# Patient Record
Sex: MALE | Race: BLACK OR AFRICAN AMERICAN | Employment: FULL TIME | URBAN - METROPOLITAN AREA
[De-identification: names, ages, dates, MRNs, and addresses within clinical notes are randomized per-mention and may not be internally consistent; named-entity substitution may affect disease eponyms.]

---

## 2017-01-25 ENCOUNTER — HOSPITAL ENCOUNTER (EMERGENCY)
Age: 37
Discharge: HOME | End: 2017-01-25
Attending: EMERGENCY MEDICINE
Payer: COMMERCIAL

## 2017-01-25 VITALS
SYSTOLIC BLOOD PRESSURE: 131 MMHG | DIASTOLIC BLOOD PRESSURE: 88 MMHG | TEMPERATURE: 99 F | WEIGHT: 150 LBS | OXYGEN SATURATION: 98 % | HEART RATE: 96 BPM | RESPIRATION RATE: 14 BRPM | HEIGHT: 69 IN | BODY MASS INDEX: 22.22 KG/M2

## 2017-01-25 DIAGNOSIS — J06.9 ACUTE UPPER RESPIRATORY INFECTION: Primary | ICD-10-CM

## 2017-01-25 PROCEDURE — 99282 EMERGENCY DEPT VISIT SF MDM: CPT

## 2017-01-25 RX ORDER — GUAIFENESIN, PSEUDOEPHEDRINE HYDROCHLORIDE 600; 60 MG/1; MG/1
1 TABLET, EXTENDED RELEASE ORAL EVERY 12 HOURS
Qty: 15 TAB | Refills: 0 | Status: SHIPPED | OUTPATIENT
Start: 2017-01-25 | End: 2017-06-06 | Stop reason: CLARIF

## 2017-01-25 RX ORDER — IBUPROFEN 800 MG/1
800 TABLET ORAL
Qty: 20 TAB | Refills: 0 | Status: SHIPPED | OUTPATIENT
Start: 2017-01-25 | End: 2017-02-01

## 2017-01-25 RX ORDER — LIDOCAINE HYDROCHLORIDE 20 MG/ML
5 SOLUTION OROPHARYNGEAL
Qty: 1 BOTTLE | Refills: 0 | Status: SHIPPED | OUTPATIENT
Start: 2017-01-25 | End: 2017-06-06 | Stop reason: CLARIF

## 2017-01-25 NOTE — ED NOTES
Seen for complaints of cold symptoms x 2 days. Sinus congestion, sore throat, dry cough, decreased appetite, nausea without vomiting. Denies other complaints. States that child is also sick. Emergency Department Nursing Plan of Care       The Nursing Plan of Care is developed from the Nursing assessment and Emergency Department Attending provider initial evaluation. The plan of care may be reviewed in the ED Provider note.     The Plan of Care was developed with the following considerations:   Patient / Family readiness to learn indicated by:verbalized understanding  Persons(s) to be included in education: patient  Barriers to Learning/Limitations:No    Signed     Kaiser Matos    1/25/2017   1:30 PM

## 2017-01-25 NOTE — ED NOTES
Patient (s)  given copy of dc instructions and 3 paper script(s) and 0 electronic scripts. Patient (s)  verbalized understanding of instructions and script (s). Patient given a current medication reconciliation form and verbalized understanding of their medications. Patient (s) verbalized understanding of the importance of discussing medications with  his or her physician or clinic they will be following up with. Patient alert and oriented and in no acute distress. Patient discharged home ambulatory.

## 2017-01-25 NOTE — DISCHARGE INSTRUCTIONS

## 2017-01-25 NOTE — ED PROVIDER NOTES
HPI Comments: 36y. o. male presents to ED complaining of cold Sx onset 2 days ago. Patient has had mild sore throat, cough, nasal congestion, sneezing and mild HA. He has taken no medications for his Sx and wants to \"nip it\" before it gets too bad. Patient denies CP, SOB, N/V/D, abdominal pain or any other symptoms or complaints. Patient is a 31 y.o. male presenting with cold symptoms. The history is provided by the patient. Cold Symptoms    Associated symptoms include congestion, sneezing, sore throat and cough. Past Medical History:   Diagnosis Date    Asthma     Sickle cell anemia ([facility] [address] 7/5/16     Diagnosed as child       History reviewed. No pertinent past surgical history. History reviewed. No pertinent family history. Social History     Social History    Marital status: SINGLE     Spouse name: N/A    Number of children: N/A    Years of education: N/A     Occupational History    Not on file. Social History Main Topics    Smoking status: Current Every Day Smoker     Packs/day: 0.50     Years: 17.00    Smokeless tobacco: Not on file    Alcohol use 8.4 oz/week     14 Cans of beer per week    Drug use: Yes     Special: Marijuana      Comment: July 2016, reports history of marijuana use    Sexual activity: Not on file     Other Topics Concern    Not on file     Social History Narrative         ALLERGIES: Review of patient's allergies indicates no known allergies. Review of Systems   HENT: Positive for congestion, sneezing and sore throat. Respiratory: Positive for cough. All other systems reviewed and are negative. Vitals:    01/25/17 1319   BP: 131/88   Pulse: 96   Resp: 14   Temp: 99 °F (37.2 °C)   SpO2: 98%   Weight: 68 kg (150 lb)   Height: 5' 9\" (1.753 m)            Physical Exam   Constitutional: He appears well-developed and well-nourished. No distress. HENT:   Head: Normocephalic and atraumatic.    Right Ear: Tympanic membrane, external ear and ear canal normal.   Left Ear: Tympanic membrane, external ear and ear canal normal.   Nose: Nose normal.   Mouth/Throat: Uvula is midline, oropharynx is clear and moist and mucous membranes are normal.   Eyes: Conjunctivae and EOM are normal. Pupils are equal, round, and reactive to light. Neck: Trachea normal, normal range of motion and full passive range of motion without pain. Neck supple. Cardiovascular: Normal rate, regular rhythm, normal heart sounds and normal pulses. Pulmonary/Chest: Effort normal and breath sounds normal.   Abdominal: Soft. Normal appearance and bowel sounds are normal. There is no tenderness. There is no rebound and no guarding. Musculoskeletal: Normal range of motion. Neurological: He is alert. He has normal strength and normal reflexes. Skin: Skin is warm and dry. He is not diaphoretic. Psychiatric: He has a normal mood and affect. His speech is normal and behavior is normal. Judgment normal.        [facility]  ED Course       Procedures    PROGRESS NOTE:  1:22 PM   Initial assessment performed. DISCHARGE NOTE:   Adriana Kimball results have been reviewed with patient and/or family. Patient and/or family has been counseled regarding diagnosis, treatment, and plan. Patient and/or family verbally conveys understanding and agreement of the signs, symptoms, diagnosis, treatment and prognosis and additionally agrees to follow up as discussed. Patient and/or family also agrees with the care-plan and conveys that all of his/her questions have been answered. I have also provided discharge instructions for the patient and/or family that include: educational information regarding their diagnosis and treatment, and list of reasons why they would want to return to the ED prior to their follow-up appointment, should patient's condition change. CLINICAL IMPRESSION    1.  Acute upper respiratory infection         AFTER VISIT PLAN    Follow-up Information     Follow up With Details Comments [address] [facility] Schedule an appointment as soon as possible for a visit in 3 days  [address] [address] [phone]    [facility] EMERGENCY DEPT  If symptoms worsen [address]  [phone]          Current Discharge Medication List      START taking these medications    Details   PSEUDOEPHEDRINE-guaiFENesin (MUCINEX D)  mg per tablet Take 1 Tab by mouth every twelve (12) hours. Qty: 15 Tab, Refills: 0      lidocaine (LIDOCAINE VISCOUS) 2 % solution Take 5 mL by mouth four (4) times daily as needed for Pain. Mix with equal parts water. Swish and swallow. Qty: 1 Bottle, Refills: 0      ibuprofen (MOTRIN) 800 mg tablet Take 1 Tab by mouth every six (6) hours as needed for Pain for up to 7 days.   Qty: 20 Tab, Refills: 0

## 2017-06-06 ENCOUNTER — HOSPITAL ENCOUNTER (EMERGENCY)
Age: 37
Discharge: HOME | End: 2017-06-06
Attending: EMERGENCY MEDICINE
Payer: SELF-PAY

## 2017-06-06 VITALS
TEMPERATURE: 98.6 F | HEART RATE: 68 BPM | OXYGEN SATURATION: 99 % | BODY MASS INDEX: 22.51 KG/M2 | WEIGHT: 152 LBS | RESPIRATION RATE: 19 BRPM | DIASTOLIC BLOOD PRESSURE: 86 MMHG | SYSTOLIC BLOOD PRESSURE: 118 MMHG | HEIGHT: 69 IN
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
## 2017-06-06 DIAGNOSIS — L03.116 CELLULITIS OF LEFT LOWER EXTREMITY: ICD-10-CM

## 2017-06-06 PROCEDURE — 99282 EMERGENCY DEPT VISIT SF MDM: CPT

## 2017-06-06 RX ORDER — NAPROXEN 500 MG/1
500 TABLET ORAL 2 TIMES DAILY WITH MEALS
Qty: 20 TAB | Refills: 0 | Status: SHIPPED | OUTPATIENT
Start: 2017-06-06 | End: 2017-06-16

## 2017-06-06 RX ORDER — DOXYCYCLINE 100 MG/1
100 CAPSULE ORAL 2 TIMES DAILY
Qty: 56 CAP | Refills: 0 | Status: SHIPPED | OUTPATIENT
Start: 2017-06-06 | End: 2017-07-04

## 2017-06-06 RX ORDER — TRAMADOL HYDROCHLORIDE 50 MG/1
50 TABLET ORAL
Qty: 18 TAB | Refills: 0 | Status: SHIPPED | OUTPATIENT
Start: 2017-06-06 | End: 2017-11-30

## 2017-06-06 NOTE — ED NOTES
Reviewed discharge instructions, follow up information and prescriptions with patient. Ambulatory independently out of ED. Provided with work excuse. Discharged home.

## 2017-06-06 NOTE — ED PROVIDER NOTES
HPI   To ED with complaints of bilat knee and R shoulder aching for several days. He attributes this to \"my sickle cell trait\". Denies sickle cell dz, has never had sickle crisis but notes that his father used to give him percocet and that helped. Yesterday noted a small area of erythema with small dark center, GF told him she noted some \"drainge\", but he did not see any. Subjective fever yesterday. On questioning states he was in the woods one week ago. 2 days ago, he noted \"very small tick\" to left groin and \"removed the whole thing\". No rash at groin. No other lesions than as described. Notes that he periodically gets \"all over joint aches\" which he attributes to sickle trait \"and want to be started on some medication that will help\". Past Medical History:   Diagnosis Date    Asthma     Sickle cell anemia ([facility] [address] 7/5/16    Diagnosed as child       History reviewed. No pertinent surgical history. History reviewed. No pertinent family history. Social History     Social History    Marital status: SINGLE     Spouse name: N/A    Number of children: N/A    Years of education: N/A     Occupational History    Not on file. Social History Main Topics    Smoking status: Current Every Day Smoker     Packs/day: 0.50     Years: 17.00    Smokeless tobacco: Not on file    Alcohol use 8.4 oz/week     14 Cans of beer per week    Drug use: Yes     Special: Marijuana      Comment: July 2016, reports history of marijuana use    Sexual activity: Not on file     Other Topics Concern    Not on file     Social History Narrative         ALLERGIES: Review of patient's allergies indicates no known allergies. Review of Systems   Constitutional: Negative for chills and fever. HENT: Negative for congestion, rhinorrhea and sore throat. Eyes: Negative for pain and discharge. Respiratory: Negative for cough, chest tightness, shortness of breath and wheezing.     Cardiovascular: Negative for chest pain and palpitations. Gastrointestinal: Negative for abdominal pain, nausea and vomiting. Genitourinary: Negative for dysuria, frequency, hematuria, penile pain, scrotal swelling and urgency. Musculoskeletal: Negative for back pain and neck pain. Skin: Negative for rash and wound. Neurological: Negative for tremors, seizures, syncope, light-headedness and headaches. Psychiatric/Behavioral: Negative for confusion. The patient is not nervous/anxious. All other systems reviewed and are negative. Vitals:    06/06/17 0903   BP: 118/86   Pulse: 68   Resp: 19   Temp: 98.6 °F (37 °C)   SpO2: 99%   Weight: 68.9 kg (152 lb)   Height: 5' 9\" (1.753 m)            Physical Exam   Constitutional: He appears well-developed and well-nourished. Nursing note and vitals reviewed. GENERAL ASSESSMENT: active, alert, no acute distress, well hydrated, well nourished  SKIN: L groin with slight solid erythema at tiny open area (1mm). No fluctuance. No adenopathy. No halo. No drainage. Left lower leg, medial aspect with erythematous tender slightly swollen area. Small (2mm) dark center. No ulcer. No fluctuance, no drainage, no streaking. HEAD: Atraumatic, normocephalic. EARS: bilateral TM's and external ear canals normal.  NOSE: nasal mucosa, septum, turbinates normal bilaterally  MOUTH: mucous membranes moist.  pharynx without erythema or exudate  NECK: supple, full range of motion, no mass  RESP: respiratory effort normal. clear to auscultation with normal breath sounds bilaterally. CARD: Regular rate and rhythm, normal S1/S2, no murmurs, normal pulses and capillary fill  ABDOMEN: normal bowel sounds, soft and non-tender throughout, nondistended, no mass, no organomegaly. NEURO: AAOX3. Non-focal.   EXTREMITY: Normal muscle tone. No deformity or tenderness. Knees, elbows, shoulders, ankles, wrist, hands all examined. No swelling. No effusions. No erythema. FROM.        MDM  Number of Diagnoses or Management Options  Cellulitis of left lower extremity:   Tick bite, initial encounter:   Diagnosis management comments: DDX: viral syndrome, cellulitis, lyme dz  No evidence of any septic joint. Cardiac exam normal.   Prior h/h have been normal.   Will cover for Lyme which will also cover the left leg cellulitis. ED Course       Procedures        LABORATORY TESTS:  No results found for this or any previous visit (from the past 12 hour(s)). IMAGING RESULTS:  No orders to display       MEDICATIONS GIVEN:  Medications - No data to display    IMPRESSION:  1. Tick bite, initial encounter    2. Cellulitis of left lower extremity        PLAN:  1. Current Discharge Medication List      START taking these medications    Details   traMADol (ULTRAM) 50 mg tablet Take 1 Tab by mouth every six (6) hours as needed for Pain. Max Daily Amount: 200 mg. Qty: 18 Tab, Refills: 0      naproxen (NAPROSYN) 500 mg tablet Take 1 Tab by mouth two (2) times daily (with meals) for 10 days. Qty: 20 Tab, Refills: 0      doxycycline (MONODOX) 100 mg capsule Take 1 Cap by mouth two (2) times a day for 28 days. Qty: 56 Cap, Refills: 0           2. Follow-up Information     Follow up With Details Comments [address]   [address] [address] [place] [address] Rd   [address]  [phone]      Be sure to follow up with [address] or [place] to establish Primary Care Provider for continue management.           Return to ED if worse

## 2017-06-06 NOTE — DISCHARGE INSTRUCTIONS
Tick Bite: Care Instructions  Your Care Instructions    Ticks are small spiderlike animals. They bite to fasten themselves onto your skin and feed on your blood. Ticks can carry diseases. But most ticks do not carry diseases, and most tick bites do not cause serious health problems. Some people may have an allergic reaction to a tick bite. This reaction may be mild, with symptoms like itching and swelling. In rare cases, a severe allergic reaction may occur. Most of the time, all you need to do for a tick bite is relieve any symptoms you may have. Follow-up care is a key part of your treatment and safety. Be sure to make and go to all appointments, and call your doctor if you are having problems. It's also a good idea to know your test results and keep a list of the medicines you take. How can you care for yourself at home? · Put ice or a cold pack on the bite for 15 to 20 minutes once an hour. Put a thin cloth between the ice and your skin. · Try an over-the-counter medicine to relieve itching, redness, swelling, and pain. Be safe with medicines. Read and follow all instructions on the label. ¨ Take an antihistamine medicine, such as chlorpheniramine (Chlor-Trimeton) or diphenhydramine (Benadryl). These medicines may help relieve itching, redness, and swelling. ¨ Use a spray of local anesthetic that contains benzocaine, such as Solarcaine. It may help relieve pain. If your skin reacts to the spray, stop using it. ¨ Put calamine lotion on the skin. It may help relieve itching. To avoid tick bites  · Avoid ticks:  ¨ Learn where ticks are found in your community, and stay away from those areas if possible. ¨ Cover as much of your body as possible when you work or play in grassy or wooded areas. ¨ Use insect repellents, such as products containing DEET. You can spray them on your skin. ¨ Take steps to control ticks on your property if you live in an area where Lyme disease occurs.  Clear leaves, brush, tall grasses, woodpiles, and stone fences from around your house and the edges of your yard or garden. This may help get rid of ticks. · When you come in from outdoors, check your body for ticks, including your groin, head, and underarms. The ticks may be about the size of a sesame seed. If no one else can help you check for ticks on your scalp, comb your hair with a fine-tooth comb. · If you find a tick, remove it quickly. Use tweezers to grasp the tick as close to its mouth (the part in your skin) as possible. Slowly pull the tick straight out--do not twist or yank--until its mouth releases from your skin. · Ticks can come into your house on clothing, outdoor gear, and pets. These ticks can fall off and attach to you. ¨ Check your clothing and outdoor gear. Remove any ticks you find. Then put your clothing in a clothes dryer on high heat for 1 hour to kill any ticks that might remain. ¨ Check your pets for ticks after they have been outdoors. · When hiking in the woods, carry a small dry jar or ziplock bag. If you find a tick on your body, remove the tick and put it in the jar or bag. Store the container in the freezer so you can give it to your doctor if symptoms develop. The tick can be tested to learn whether it is carrying the bacteria that cause Lyme disease. When should you call for help? Call 911 anytime you think you may need emergency care. For example, call if:  · You have symptoms of a severe allergic reaction. These may include:  ¨ Sudden raised, red areas (hives) all over your body. ¨ Swelling of the throat, mouth, lips, or tongue. ¨ Trouble breathing. ¨ Passing out (losing consciousness). Or you may feel very lightheaded or suddenly feel weak, confused, or restless. Call your doctor now or seek immediate medical care if:  · You have signs of infection, such as:  ¨ Increased pain, swelling, warmth, or redness around the bite. ¨ Red streaks leading from the bite.   ¨ Pus draining from the bite. ¨ A fever. Watch closely for changes in your health, and be sure to contact your doctor if:  · You develop a new rash. · You have joint pain. · You are very tired. · You have flu-like symptoms. · You have symptoms for more than 1 week. Where can you learn more? Go to http://lito-gaviota.info/. Enter A067 in the search box to learn more about \"Tick Bite: Care Instructions. \"  Current as of: May 27, 2016  Content Version: 11.2  © 0423-4297 [employer]. Care instructions adapted under license by [employer] (which disclaims liability or warranty for this information). If you have questions about a medical condition or this instruction, always ask your healthcare professional. [address] any warranty or liability for your use of this information. Cellulitis: Care Instructions  Your Care Instructions    Cellulitis is a skin infection. It often occurs after a break in the skin from a scrape, cut, bite, or puncture, or after a rash. The doctor has checked you carefully, but problems can develop later. If you notice any problems or new symptoms, get medical treatment right away. Follow-up care is a key part of your treatment and safety. Be sure to make and go to all appointments, and call your doctor if you are having problems. It's also a good idea to know your test results and keep a list of the medicines you take. How can you care for yourself at home? · Take your antibiotics as directed. Do not stop taking them just because you feel better. You need to take the full course of antibiotics. · Prop up the infected area on pillows to reduce pain and swelling. Try to keep the area above the level of your heart as often as you can. · If your doctor told you how to care for your wound, follow your doctor's instructions.  If you did not get instructions, follow this general advice:  ¨ Wash the wound with clean water 2 times a day. Don't use hydrogen peroxide or alcohol, which can slow healing. ¨ You may cover the wound with a thin layer of petroleum jelly, such as Vaseline, and a nonstick bandage. ¨ Apply more petroleum jelly and replace the bandage as needed. · Be safe with medicines. Take pain medicines exactly as directed. ¨ If the doctor gave you a prescription medicine for pain, take it as prescribed. ¨ If you are not taking a prescription pain medicine, ask your doctor if you can take an over-the-counter medicine. To prevent cellulitis in the future  · Try to prevent cuts, scrapes, or other injuries to your skin. Cellulitis most often occurs where there is a break in the skin. · If you get a scrape, cut, mild burn, or bite, wash the wound with clean water as soon as you can to help avoid infection. Don't use hydrogen peroxide or alcohol, which can slow healing. · If you have swelling in your legs (edema), support stockings and good skin care may help prevent leg sores and cellulitis. · Take care of your feet, especially if you have diabetes or other conditions that increase the risk of infection. Wear shoes and socks. Do not go barefoot. If you have athlete's foot or other skin problems on your feet, talk to your doctor about how to treat them. When should you call for help? Call your doctor now or seek immediate medical care if:  · You have signs that your infection is getting worse, such as:  ¨ Increased pain, swelling, warmth, or redness. ¨ Red streaks leading from the area. ¨ Pus draining from the area. ¨ A fever. · You get a rash. Watch closely for changes in your health, and be sure to contact your doctor if:  · You are not getting better after 1 day (24 hours). · You do not get better as expected. Where can you learn more? Go to http://lito-gaviota.info/. Leoncio Woodard in the search box to learn more about \"Cellulitis: Care Instructions. \"  Current as of: October 13, 2016  Content Version: 11.2  © 6499-4316 [employer]. Care instructions adapted under license by [employer] (which disclaims liability or warranty for this information). If you have questions about a medical condition or this instruction, always ask your healthcare professional. [address] any warranty or liability for your use of this information. Lyme Disease: Care Instructions  Your Care Instructions    Lyme disease is a bacterial infection spread by ticks. Antibiotics can treat Lyme disease. If you do not treat Lyme disease, it can lead to problems with your skin, joints, heart, and nervous system. These problems can develop weeks, months, or even years after you get the infection. Your doctor may prescribe antibiotics even if it is not yet certain that you have Lyme disease. Follow-up care is a key part of your treatment and safety. Be sure to make and go to all appointments, and call your doctor if you are having problems. It's also a good idea to know your test results and keep a list of the medicines you take. How can you care for yourself at home? · Take your antibiotics as directed. Do not stop taking them just because you feel better. You need to take the full course of antibiotics. · Take an over-the-counter pain medicine if needed, such as acetaminophen (Tylenol), ibuprofen (Advil, Motrin), or naproxen (Aleve). Read and follow all instructions on the label. To prevent Lyme disease in the future  · Avoid ticks:  ¨ Learn where ticks are found in your community, and stay away from those areas if possible. ¨ Cover as much of your body as possible when you work or play in grassy or wooded areas. ¨ Use insect repellents, such as products containing DEET. You can spray them on your skin. ¨ Take steps to control ticks on your property if you live in an area where Lyme disease occurs.  Clear leaves, brush, tall grasses, woodpiles, and stone fences from around your house and the edges of your yard or garden. This may help get rid of ticks. · When you come in from outdoors, check your body for ticks, including your groin, head, and underarms. The ticks may be about the size of a poppy seed. If no one else can help you check for ticks on your scalp, comb your hair with a fine-tooth comb. · If you find a tick, remove it quickly. If you can't remove it with your fingers, use tweezers to grasp the tick as close to its mouth (the part in your skin) as possible. Slowly pull the tick straight out--do not twist or yank--until its mouth releases from your skin. · Ticks can come into your house on clothing, outdoor gear, and pets. These ticks can fall off and attach to you. ¨ Check your clothing and outdoor gear. Remove any ticks you find. Then put your clothing in a clothes dryer on high heat for 1 hour to kill any ticks that might remain. ¨ Check your pets for ticks after they have been outdoors. · When hiking in the woods, carry a small dry jar or ziplock bag. If you find a tick on your body, remove the tick and put it in the jar or bag. Store the container in the freezer so you can give it to your doctor if symptoms develop. The tick can be tested to learn whether it is carrying the bacteria that cause Lyme disease. When should you call for help? Call your doctor now or seek immediate medical care if:  · You have skipping or pounding heartbeats, a severe headache, or neck pain. Watch closely for changes in your health, and be sure to contact your doctor if:  · You develop a new rash. · You are very tired. · You have joint pain (especially with redness and swelling). · You do not get better as expected. Where can you learn more? Go to http://lito-gaviota.info/. Enter Y599 in the search box to learn more about \"Lyme Disease: Care Instructions. \"  Current as of: May 24, 2016  Content Version: 11.2  © 8336-9679 [employer].  Care instructions adapted under license by [employer] (which disclaims liability or warranty for this information). If you have questions about a medical condition or this instruction, always ask your healthcare professional. [address] any warranty or liability for your use of this information.

## 2017-11-01 ENCOUNTER — HOSPITAL ENCOUNTER (EMERGENCY)
Age: 37
Discharge: HOME | End: 2017-11-01
Attending: EMERGENCY MEDICINE
Payer: SELF-PAY

## 2017-11-01 ENCOUNTER — APPOINTMENT (OUTPATIENT)
Dept: GENERAL RADIOLOGY | Age: 37
End: 2017-11-01
Attending: EMERGENCY MEDICINE
Payer: SELF-PAY

## 2017-11-01 VITALS
DIASTOLIC BLOOD PRESSURE: 60 MMHG | SYSTOLIC BLOOD PRESSURE: 138 MMHG | TEMPERATURE: 98.6 F | BODY MASS INDEX: 21.76 KG/M2 | OXYGEN SATURATION: 99 % | HEIGHT: 70 IN | WEIGHT: 152 LBS | HEART RATE: 98 BPM | RESPIRATION RATE: 19 BRPM

## 2017-11-01 DIAGNOSIS — J06.9 ACUTE UPPER RESPIRATORY INFECTION: Primary | ICD-10-CM

## 2017-11-01 LAB
FLUAV AG NPH QL IA: NEGATIVE
FLUBV AG NOSE QL IA: NEGATIVE

## 2017-11-01 PROCEDURE — 99282 EMERGENCY DEPT VISIT SF MDM: CPT

## 2017-11-01 PROCEDURE — 87804 INFLUENZA ASSAY W/OPTIC: CPT | Performed by: EMERGENCY MEDICINE

## 2017-11-01 PROCEDURE — 71010 XR CHEST SNGL V: CPT

## 2017-11-01 RX ORDER — NAPROXEN 375 MG/1
375 TABLET ORAL 2 TIMES DAILY WITH MEALS
Qty: 20 TAB | Refills: 0 | Status: SHIPPED | OUTPATIENT
Start: 2017-11-01 | End: 2017-11-30

## 2017-11-01 NOTE — LETTER
[facility] EMERGENCY DEPT 
[address] [id number] [phone] Work/School Note Date: 11/1/2017 To Whom It May concern: 
 
Mitch Olivares was seen and treated today in the emergency room by the following provider(s): 
Attending Provider: Cricket Frausto MD. Mitch Olivares may return to work on 11/3/2017. Sincerely, Cricket Frausto MD

## 2017-11-01 NOTE — ED PROVIDER NOTES
[address] [place] [address]  EMERGENCY DEPARTMENT HISTORY AND PHYSICAL EXAM         Date of Service: 11/1/2017   Patient Name: Mahesh Ramirez   YOB: 1980  Medical Record Number: [id number]    History of Presenting Illness     Chief Complaint   Patient presents with    Cough     per pt reports productive cough with yellow thick sputum x 2-3 days that is causing chest tightness, intermittent sob and sore throat, denies fever, chills.  Sickle Cell Disease     per pt reports chronic fatigue and leg cramping, \"I think my sickle cell is flaring up. \"         History Provided By:  patient    Additional History:   Mahesh Ramirez is a 30 y.o. male with PMhx significant for asthma and sickle cell trait who presents ambulatory to the ED with cc of productive cough for the past 2 days. Pt states he has a history of pneumonia as a child, and he does not have an inhaler or nebulizer. Pt does not use allergy medication. He additionally c/o constant, moderate, bilateral leg pain shooting to his back for the past couple days, which he attributes to the sickle cell. He denies any fever. Social Hx: + Tobacco, + EtOH, + Illicit Drugs (marijuana)    There are no other complaints, changes or physical findings at this time. Primary Care Provider: None     Past History     Past Medical History:   Past Medical History:   Diagnosis Date    Asthma     [facility]    Diagnosed as child        Past Surgical History:   No past surgical history on file. Family History:   No family history on file. Social History:   Social History   Substance Use Topics    Smoking status: Current Every Day Smoker     Packs/day: 0.50     Years: 17.00    Smokeless tobacco: Not on file    Alcohol use 8.4 oz/week     14 Cans of beer per week        Allergies:   No Known Allergies     Review of Systems   Review of Systems   Constitutional: Negative for chills and fever.    HENT: Negative for congestion, rhinorrhea, sneezing and sore throat. Eyes: Negative for redness and visual disturbance. Respiratory: Positive for cough. Negative for shortness of breath. Cardiovascular: Negative for chest pain and leg swelling. Gastrointestinal: Negative for abdominal pain, nausea and vomiting. Genitourinary: Negative for difficulty urinating and frequency. Musculoskeletal: Positive for back pain and myalgias. Negative for neck stiffness. Skin: Negative for rash. Neurological: Negative for dizziness, syncope, weakness and headaches. Hematological: Negative for adenopathy. All other systems reviewed and are negative. Physical Exam  Physical Exam   Constitutional: He is oriented to person, place, and time. He appears well-developed and well-nourished. HENT:   Head: Normocephalic and atraumatic. Mouth/Throat: Oropharynx is clear and moist and mucous membranes are normal.   Fluid behind bilateral TM's. Tonsillar hypertrophy (baseline per patient). Eyes: EOM are normal.   Neck: Normal range of motion and full passive range of motion without pain. Neck supple. Cardiovascular: Normal rate, regular rhythm, normal heart sounds, intact distal pulses and normal pulses. No murmur heard. Pulmonary/Chest: Effort normal and breath sounds normal. No respiratory distress. He exhibits no tenderness. Abdominal: Soft. Normal appearance and bowel sounds are normal. There is no splenomegaly or hepatomegaly. There is no tenderness. There is no rebound and no guarding. Musculoskeletal:   Nail bed clubbing. Neurological: He is alert and oriented to person, place, and time. He has normal strength. Skin: Skin is warm, dry and intact. No rash noted. No erythema. Psychiatric: He has a normal mood and affect. His speech is normal and behavior is normal. Judgment and thought content normal.   Nursing note and vitals reviewed. Medical Decision Making   I am the first provider for this patient.      I reviewed the vital signs, available nursing notes, past medical history, past surgical history, family history and social history. Provider Notes: DDx: URI, pneumonia, sickle cell flare     ED Course:  1:59 PM   Initial assessment performed. The patients presenting problems have been discussed, and they are in agreement with the care plan formulated and outlined with them. I have encouraged them to ask questions as they arise throughout their visit. Diagnostic Study Results   Labs -      Recent Results (from the past 12 hour(s))   INFLUENZA A & B AG (RAPID TEST)    Collection Time: 11/01/17  2:31 PM   Result Value Ref Range    Influenza A Antigen NEGATIVE  NEG      Influenza B Antigen NEGATIVE  NEG         Radiologic Studies -  The following have been ordered and reviewed:    CXR Results  (Last 48 hours)               11/01/17 1442  XR CHEST SNGL V Final result    Impression:  IMPRESSION:    Clear lungs. Narrative:  PORTABLE CHEST RADIOGRAPH/S: 11/1/2017 2:42 PM       INDICATION: Productive cough with thick yellow sputum for 2 to 3 days is causing   chest tightness, intermittent dyspnea, sore throat. No fever or chills. History   of sickle cell disease. COMPARISON: None. TECHNIQUE: Portable frontal upright radiograph/s of the chest.       FINDINGS:    The lungs are clear. The central airways are patent. No pneumothorax or pleural   effusion. Vital Signs-Reviewed the patient's vital signs. Patient Vitals for the past 12 hrs:   Temp Pulse Resp BP SpO2   11/01/17 1337 98.6 °F (37 °C) 98 19 138/60 99 %       Diagnosis:  Clinical Impression:   1.  Acute upper respiratory infection         Plan:  1:   Follow-up Information     Follow up With Details Comments Contact Info    [facility] Schedule an appointment as soon as possible for a visit As needed [address] [place] [address] [facility] [address] [phone]          2:   Current Discharge Medication List      START taking these medications    Details   dextromethorphan-guaiFENesin (ROBITUSSIN-DM)  mg/5 mL syrup Take 10 mL by mouth every six (6) hours as needed for Cough. Qty: 240 mL, Refills: 0      naproxen (NAPROSYN) 375 mg tablet Take 1 Tab by mouth two (2) times daily (with meals). Qty: 20 Tab, Refills: 0           Return to ED if worse. Disposition:    DISCHARGE NOTE  3:13 PM  The patient has been re-evaluated and is ready for discharge. Reviewed available results with patient. Counseled pt on diagnosis and care plan. Pt has expressed understanding, and all questions have been answered. Pt agrees with plan and agrees to follow up as recommended, or return to the ED if their symptoms worsen. Discharge instructions have been provided and explained to the pt, along with reasons to return to the ED. Attestations: This note is prepared by Anusha Reis, acting as Scribe for Dr Lucio. Dr Lucio: The scribe's documentation has been prepared under my direction and personally reviewed by me in its entirety. I confirm that the note above accurately reflects all work, treatment, procedures, and medical decision making performed by me.

## 2017-11-01 NOTE — DISCHARGE INSTRUCTIONS
Upper Respiratory Infection (Cold): Care Instructions  Your Care Instructions    An upper respiratory infection, or URI, is an infection of the nose, sinuses, or throat. URIs are spread by coughs, sneezes, and direct contact. The common cold is the most frequent kind of URI. The flu and sinus infections are other kinds of URIs. Almost all URIs are caused by viruses. Antibiotics won't cure them. But you can treat most infections with home care. This may include drinking lots of fluids and taking over-the-counter pain medicine. You will probably feel better in 4 to 10 days. The doctor has checked you carefully, but problems can develop later. If you notice any problems or new symptoms, get medical treatment right away. Follow-up care is a key part of your treatment and safety. Be sure to make and go to all appointments, and call your doctor if you are having problems. It's also a good idea to know your test results and keep a list of the medicines you take. How can you care for yourself at home? · To prevent dehydration, drink plenty of fluids, enough so that your urine is light yellow or clear like water. Choose water and other caffeine-free clear liquids until you feel better. If you have kidney, heart, or liver disease and have to limit fluids, talk with your doctor before you increase the amount of fluids you drink. · Take an over-the-counter pain medicine, such as acetaminophen (Tylenol), ibuprofen (Advil, Motrin), or naproxen (Aleve). Read and follow all instructions on the label. · Before you use cough and cold medicines, check the label. These medicines may not be safe for young children or for people with certain health problems. · Be careful when taking over-the-counter cold or flu medicines and Tylenol at the same time. Many of these medicines have acetaminophen, which is Tylenol. Read the labels to make sure that you are not taking more than the recommended dose.  Too much acetaminophen (Tylenol) can be harmful. · Get plenty of rest.  · Do not smoke or allow others to smoke around you. If you need help quitting, talk to your doctor about stop-smoking programs and medicines. These can increase your chances of quitting for good. When should you call for help? Call 911 anytime you think you may need emergency care. For example, call if:  ? · You have severe trouble breathing. ?Call your doctor now or seek immediate medical care if:  ? · You seem to be getting much sicker. ? · You have new or worse trouble breathing. ? · You have a new or higher fever. ? · You have a new rash. ? Watch closely for changes in your health, and be sure to contact your doctor if:  ? · You have a new symptom, such as a sore throat, an earache, or sinus pain. ? · You cough more deeply or more often, especially if you notice more mucus or a change in the color of your mucus. ? · You do not get better as expected. Where can you learn more? Go to http://lito-gaviota.info/. Enter Q912 in the search box to learn more about \"Upper Respiratory Infection (Cold): Care Instructions. \"  Current as of: May 12, 2017  Content Version: 11.4  © 1738-1610 [employer]. Care instructions adapted under license by [employer] (which disclaims liability or warranty for this information). If you have questions about a medical condition or this instruction, always ask your healthcare professional. [place] [address] any warranty or liability for your use of this information.

## 2017-11-01 NOTE — ED NOTES
Emergency Department Nursing Plan of Care       The Nursing Plan of Care is developed from the Nursing assessment and Emergency Department Attending provider initial evaluation. The plan of care may be reviewed in the ED Provider note.     The Plan of Care was developed with the following considerations:   Patient / Family readiness to learn indicated by:verbalized understanding  Persons(s) to be included in education: patient  Barriers to Learning/Limitations:No    Signed     Khanh Betancourt RN    11/1/2017   2:01 PM

## 2017-11-30 ENCOUNTER — HOSPITAL ENCOUNTER (EMERGENCY)
Age: 37
Discharge: HOME | End: 2017-11-30
Attending: INTERNAL MEDICINE
Payer: SELF-PAY

## 2017-11-30 ENCOUNTER — APPOINTMENT (OUTPATIENT)
Dept: GENERAL RADIOLOGY | Age: 37
End: 2017-11-30
Attending: EMERGENCY MEDICINE
Payer: SELF-PAY

## 2017-11-30 VITALS
SYSTOLIC BLOOD PRESSURE: 136 MMHG | HEIGHT: 69 IN | DIASTOLIC BLOOD PRESSURE: 75 MMHG | BODY MASS INDEX: 22.66 KG/M2 | RESPIRATION RATE: 18 BRPM | TEMPERATURE: 98.3 F | HEART RATE: 97 BPM | WEIGHT: 153 LBS | OXYGEN SATURATION: 96 %

## 2017-11-30 DIAGNOSIS — S60.10XA SUBUNGUAL HEMATOMA OF DIGIT OF HAND, INITIAL ENCOUNTER: ICD-10-CM

## 2017-11-30 DIAGNOSIS — S62.639A CLOSED FRACTURE OF TUFT OF DISTAL PHALANX OF FINGER: Primary | ICD-10-CM

## 2017-11-30 PROCEDURE — 77030008323 HC SPLNT FNGR GTR DJOR -A

## 2017-11-30 PROCEDURE — 99283 EMERGENCY DEPT VISIT LOW MDM: CPT

## 2017-11-30 PROCEDURE — 74011250637 HC RX REV CODE- 250/637: Performed by: PHYSICIAN ASSISTANT

## 2017-11-30 PROCEDURE — 73140 X-RAY EXAM OF FINGER(S): CPT

## 2017-11-30 RX ORDER — HYDROCODONE BITARTRATE AND ACETAMINOPHEN 5; 325 MG/1; MG/1
1 TABLET ORAL
Qty: 12 TAB | Refills: 0 | Status: SHIPPED | OUTPATIENT
Start: 2017-11-30 | End: 2018-07-30

## 2017-11-30 RX ORDER — IBUPROFEN 800 MG/1
800 TABLET ORAL
Qty: 20 TAB | Refills: 0 | Status: SHIPPED | OUTPATIENT
Start: 2017-11-30 | End: 2018-07-30

## 2017-11-30 RX ORDER — IBUPROFEN 400 MG/1
800 TABLET ORAL
Status: COMPLETED | OUTPATIENT
Start: 2017-11-30 | End: 2017-11-30

## 2017-11-30 RX ADMIN — IBUPROFEN 800 MG: 400 TABLET, FILM COATED ORAL at 17:34

## 2017-11-30 NOTE — ED NOTES
Emergency Department Nursing Plan of Care       The Nursing Plan of Care is developed from the Nursing assessment and Emergency Department Attending provider initial evaluation. The plan of care may be reviewed in the ED Provider note.     The Plan of Care was developed with the following considerations:   Patient / Family readiness to learn indicated by:verbalized understanding  Persons(s) to be included in education: patient  Barriers to Learning/Limitations:No    [facility]    11/30/2017   5:28 PM

## 2017-11-30 NOTE — DISCHARGE INSTRUCTIONS
Finger Fracture: Care Instructions  Your Care Instructions    Breaks in the bones of the finger usually heal well in about 3 to 4 weeks. The pain and swelling from a broken finger can last for weeks. But it should steadily improve, starting a few days after you break it. It is very important that you wear and take care of the cast or splint exactly as your doctor tells you to so that your finger heals properly and does not end up crooked. Wearing a splint may interfere with your normal activities. Ask for help with daily tasks if you need it. You heal best when you take good care of yourself. Eat a variety of healthy foods, and don't smoke. Follow-up care is a key part of your treatment and safety. Be sure to make and go to all appointments, and call your doctor if you are having problems. It's also a good idea to know your test results and keep a list of the medicines you take. How can you care for yourself at home? · If your doctor put a splint on your finger, wear the splint exactly as directed. Do not remove it until your doctor says that you can. · Keep your hand raised above the level of your heart as much as you can. This will help reduce swelling. · Put ice or a cold pack on your finger for 10 to 20 minutes at a time. Try to do this every 1 to 2 hours for the next 3 days (when you are awake) or until the swelling goes down. Put a thin cloth between the ice and your skin. Keep the splint dry. · Be safe with medicines. Take pain medicines exactly as directed. ¨ If the doctor gave you a prescription medicine for pain, take it as prescribed. ¨ If you are not taking a prescription pain medicine, ask your doctor if you can take an over-the-counter medicine. When should you call for help? Call 911 anytime you think you may need emergency care. For example, call if:  ? · Your finger is cool or pale or changes color.    ?Call your doctor now or seek immediate medical care if:  ? · Your pain gets much worse.   ? · You have tingling, weakness, or numbness in your finger. ? · You have signs of infection, such as:  ¨ Increased pain, swelling, warmth, or redness. ¨ Red streaks leading from the area. ¨ Pus draining from the area. ¨ Swollen lymph nodes in your neck, armpits, or groin. ¨ A fever. ? Watch closely for changes in your health, and be sure to contact your doctor if:  ? · Your finger is not steadily improving. Where can you learn more? Go to http://lito-gaviota.info/. Enter N461 in the search box to learn more about \"Finger Fracture: Care Instructions. \"  Current as of: March 21, 2017  Content Version: 11.4  © 8704-1597 [employer]. Care instructions adapted under license by [employer] (which disclaims liability or warranty for this information). If you have questions about a medical condition or this instruction, always ask your healthcare professional. [place] [address] any warranty or liability for your use of this information.

## 2017-11-30 NOTE — ED TRIAGE NOTES
C/o left 2nd finger pain/swelling since accidentally hitting it with a hammer around 0900 today, ecchymosis/1+ edema noted, took tylenol around 1.5 hrs ago

## 2017-11-30 NOTE — ED PROVIDER NOTES
Patient is a 30 y.o. male presenting with finger pain. The history is provided by the patient. Finger Pain    This is a new problem. Episode onset: this morning around 9am pt states he hit his finger with a hammer accidentally. The problem occurs constantly. The problem has been gradually worsening. Pain location: L index finger. The pain is at a severity of 9/10. The pain is moderate. Pertinent negatives include no numbness and no tingling. The symptoms are aggravated by palpation and activity. He has tried nothing for the symptoms. There has been a history of trauma. Past Medical History:   Diagnosis Date    Asthma     [facility]    Diagnosed as child       History reviewed. No pertinent surgical history. History reviewed. No pertinent family history. Social History     Social History    Marital status: SINGLE     Spouse name: N/A    Number of children: N/A    Years of education: N/A     Occupational History    Not on file. Social History Main Topics    Smoking status: Current Every Day Smoker     Packs/day: 0.50     Years: 17.00    Smokeless tobacco: Not on file    Alcohol use 8.4 oz/week     14 Cans of beer per week    Drug use: Yes     Special: Marijuana      Comment: July 2016, reports history of marijuana use    Sexual activity: Not on file     Other Topics Concern    Not on file     Social History Narrative         ALLERGIES: Review of patient's allergies indicates no known allergies. Review of Systems   Musculoskeletal: Positive for arthralgias. Neurological: Negative for tingling, weakness and numbness. All other systems reviewed and are negative. Vitals:    11/30/17 1712   BP: 136/75   Pulse: 97   Resp: 18   Temp: 98.3 °F (36.8 °C)   SpO2: 96%   Weight: 69.4 kg (153 lb)   Height: 5' 9\" (1.753 m)            Physical Exam   Constitutional: He is oriented to person, place, and time. He appears well-developed and well-nourished. No distress. HENT:   Head: Normocephalic and atraumatic. Mouth/Throat: Oropharynx is clear and moist. No oropharyngeal exudate. Eyes: Conjunctivae are normal.   Cardiovascular: Normal rate, regular rhythm and normal heart sounds. Pulmonary/Chest: Effort normal and breath sounds normal. No respiratory distress. He has no wheezes. He has no rales. Musculoskeletal: Normal range of motion. Left hand: He exhibits bony tenderness (of DIP joint, +subungual hematoma with approx 5% of nail plate, small amount of ecchymosis appreciated to the volar aspect of L 2nd digit). He exhibits normal capillary refill and no deformity. + Digital clubbing of fingers   Neurological: He is alert and oriented to person, place, and time. Skin: Skin is warm and dry. Psychiatric: He has a normal mood and affect. His behavior is normal. Judgment and thought content normal.   Nursing note and vitals reviewed. at 5:50 PM      MDM  Number of Diagnoses or Management Options  Closed fracture of tuft of distal phalanx of finger:   Diagnosis management comments: DDX: fracture, subungual hematoma, contusion       Amount and/or Complexity of Data Reviewed  Tests in the radiology section of CPT®: ordered and reviewed      ED Course       Procedures      MEDICATIONS GIVEN:  Medications   ibuprofen (MOTRIN) tablet 800 mg (800 mg Oral Given 11/30/17 9037)       LABS REVIEWED:  No results found for this or any previous visit (from the past 24 hour(s)). RADIOLOGY RESULTS:  The following have been ordered and reviewed:  XR 2ND FINGER LT MIN 2 V   Final Result        Study Result      EXAM:  XR 2ND FINGER LT MIN 2 V     INDICATION:   left finger pain/swelling since hitting it with a hammer earlier  this AM.     COMPARISON: None.     FINDINGS: Three views of the left  second finger. There is a nondisplaced  fracture of the second distal phalangeal tuft. No additional fracture. No  dislocation.  There is soft tissue swelling of the distal second finger.     IMPRESSION  IMPRESSION:   Nondisplaced fracture of the second distal phalangeal tuft with  adjacent soft tissue swelling. PROGRESS NOTE:  A/P  5:47 PM  Definitive fracture care:    Fingler Splint applied, +NVI, ortho follow up given. Reviewed imaging results with pt. The patient's signs, symptoms, diagnosis, and discharge instruction have been discussed and the patient has conveyed their understanding. The patient is to follow up with ortho or return to the ER should their symptoms worsen. Plan has been discussed and the patient is in agreement. Pt is ready for discharge      DIAGNOSIS:  1. Closed fracture of tuft of distal phalanx of finger    2. Subungual hematoma    PLAN:  Follow-up Information     Follow up With Details Comments 279 [facility], 600 [facility]  [address] Schedule an appointment as soon as possible for a visit on 12/4/2017  [address] [place] [address] Court  [address]  [facility] [facility]  [phone]        Current Discharge Medication List      START taking these medications    Details   ibuprofen (MOTRIN) 800 mg tablet Take 1 Tab by mouth every eight (8) hours as needed for Pain. Qty: 20 Tab, Refills: 0      HYDROcodone-acetaminophen (NORCO) 5-325 mg per tablet Take 1 Tab by mouth every four (4) hours as needed for Pain. Max Daily Amount: 6 Tabs.   Qty: 12 Tab, Refills: 0         STOP taking these medications       dextromethorphan-guaiFENesin (ROBITUSSIN-DM)  mg/5 mL syrup Comments:   Reason for Stopping:         naproxen (NAPROSYN) 375 mg tablet Comments:   Reason for Stopping:         traMADol (ULTRAM) 50 mg tablet Comments:   Reason for Stopping:

## 2018-03-24 ENCOUNTER — HOSPITAL ENCOUNTER (EMERGENCY)
Age: 38
Discharge: HOME | End: 2018-03-24
Attending: EMERGENCY MEDICINE
Payer: SELF-PAY

## 2018-03-24 VITALS
BODY MASS INDEX: 22.96 KG/M2 | DIASTOLIC BLOOD PRESSURE: 77 MMHG | SYSTOLIC BLOOD PRESSURE: 123 MMHG | HEIGHT: 69 IN | HEART RATE: 105 BPM | RESPIRATION RATE: 16 BRPM | WEIGHT: 155 LBS | TEMPERATURE: 98.4 F | OXYGEN SATURATION: 99 %

## 2018-03-24 DIAGNOSIS — J03.00 ACUTE STREPTOCOCCAL TONSILLITIS, NOT SPECIFIED AS RECURRENT OR NOT: Primary | ICD-10-CM

## 2018-03-24 PROCEDURE — 96372 THER/PROPH/DIAG INJ SC/IM: CPT

## 2018-03-24 PROCEDURE — 99282 EMERGENCY DEPT VISIT SF MDM: CPT

## 2018-03-24 PROCEDURE — 74011250636 HC RX REV CODE- 250/636: Performed by: EMERGENCY MEDICINE

## 2018-03-24 PROCEDURE — 74011000250 HC RX REV CODE- 250: Performed by: EMERGENCY MEDICINE

## 2018-03-24 RX ORDER — AMOXICILLIN 500 MG/1
500 TABLET, FILM COATED ORAL 3 TIMES DAILY
Qty: 30 TAB | Refills: 0 | Status: SHIPPED | OUTPATIENT
Start: 2018-03-24 | End: 2018-04-03

## 2018-03-24 RX ADMIN — CEFTRIAXONE 1 G: 1 INJECTION, POWDER, FOR SOLUTION INTRAMUSCULAR; INTRAVENOUS at 02:56

## 2018-03-24 NOTE — ED NOTES
Discharge instructions were given to the patient by Mayuri Baltazar RN. The patient left the Emergency Department ambulatory, alert and oriented and in no acute distress with 1 prescription. The patient was encouraged to call or return to the ED for worsening issues or problems and was encouraged to schedule a follow up appointment for continuing care. The patient verbalized understanding of discharge instructions and prescriptions, all questions were answered. The patient has no further concerns at this time.

## 2018-03-24 NOTE — ED PROVIDER NOTES
EMERGENCY DEPARTMENT HISTORY AND PHYSICAL EXAM      Date: 3/24/2018  Patient Name: Jean Carlos Landers    History of Presenting Illness     Chief Complaint   Patient presents with    Cold Symptoms     x2 days, reporting right ear pain, sore throat, nasal congestion, chills and back pain       History Provided By: Patient    HPI: Jean Carlos Landers, 30 y.o. male with PMHx significant for sickle cell anemia, asthma, presents ambulatory to the ED with cc of persistent sore throat with associated right ear pain x 2 days. Pt also reports lower back myalgias, nasal congestion, and chills x 2 days. Pt describes his sore throat as a constant burning that is exacerbated by swallowing. He states \"it hurts to swallow\" and rates the pain 9/10 on the pain scale. Pt denies allergies to medication. He denies taking regular medication or having a hx of HTN or DM. Pt specifically denies fever, N/V/D, cough, SOB, HA, light-headedness, dizziness. PCP: None    There are no other complaints, changes, or physical findings at this time. Current Facility-Administered Medications   Medication Dose Route Frequency Provider Last Rate Last Dose    cefTRIAXone (ROCEPHIN) 1 g in lidocaine (XYLOCAINE) 10 mg/mL (1 %) IM syringe  1 g IntraMUSCular NOW Madeline Jackson MD         Current Outpatient Prescriptions   Medication Sig Dispense Refill    amoxicillin 500 mg tab Take 500 mg by mouth three (3) times daily for 10 days. 30 Tab 0    ibuprofen (MOTRIN) 800 mg tablet Take 1 Tab by mouth every eight (8) hours as needed for Pain. 20 Tab 0    HYDROcodone-acetaminophen (NORCO) 5-325 mg per tablet Take 1 Tab by mouth every four (4) hours as needed for Pain. Max Daily Amount: 6 Tabs. 12 Tab 0       Past History     Past Medical History:  Past Medical History:   Diagnosis Date    Asthma     Sickle cell anemia ([facility] [address] 7/5/16    Diagnosed as child       Past Surgical History:  No past surgical history on file.     Family History:  No family history on file.    Social History:  Social History   Substance Use Topics    Smoking status: Current Every Day Smoker     Packs/day: 0.50     Years: 17.00    Smokeless tobacco: Not on file    Alcohol use 8.4 oz/week     14 Cans of beer per week       Allergies:  No Known Allergies      Review of Systems   Review of Systems   Constitutional: Positive for chills. Negative for fever. HENT: Positive for congestion (nasal), ear pain (R) and sore throat. Negative for rhinorrhea. Eyes: Negative for photophobia and discharge. Respiratory: Negative for cough and shortness of breath. Cardiovascular: Negative for chest pain and palpitations. Gastrointestinal: Negative for diarrhea, nausea and vomiting. Genitourinary: Negative for dysuria and hematuria. Musculoskeletal: Positive for myalgias (lower back). Negative for back pain and neck pain. Skin: Negative for rash and wound. Allergic/Immunologic: Negative for immunocompromised state. Neurological: Negative for dizziness, light-headedness and headaches. All other systems reviewed and are negative. Physical Exam   Physical Exam   Constitutional: He is oriented to person, place, and time. He appears well-developed and well-nourished. HENT:   Head: Normocephalic and atraumatic. Mouth/Throat: Mucous membranes are normal. Posterior oropharyngeal erythema present. No oropharyngeal exudate or tonsillar abscesses. Enlarged tonsils   Eyes: EOM are normal.   Neck: Normal range of motion and full passive range of motion without pain. Neck supple. Cardiovascular: Normal rate, regular rhythm, normal heart sounds, intact distal pulses and normal pulses. No murmur heard. Pulmonary/Chest: Effort normal and breath sounds normal. No respiratory distress. He exhibits no tenderness. Abdominal: Soft. Normal appearance and bowel sounds are normal. There is no tenderness. There is no rebound and no guarding.    Neurological: He is alert and oriented to person, place, and time. He has normal strength. Skin: Skin is warm, dry and intact. No rash noted. No erythema. Psychiatric: He has a normal mood and affect. His speech is normal and behavior is normal. Judgment and thought content normal.   Nursing note and vitals reviewed. Medical Decision Making   I am the first provider for this patient. I reviewed the vital signs, available nursing notes, past medical history, past surgical history, family history and social history. Vital Signs-Reviewed the patient's vital signs. Patient Vitals for the past 12 hrs:   Temp Pulse Resp BP SpO2   03/24/18 0136 98.4 °F (36.9 °C) (!) 105 16 123/77 99 %       Records Reviewed: Nursing Notes, Old Medical Records, Previous electrocardiograms, Ambulance Run Sheet, Previous Radiology Studies and Previous Laboratory Studies    Provider Notes (Medical Decision Making):   DDx: tonsillitis, possible strep, possible pharyngitis, unlikely infectious mono    ED Course:   Initial assessment performed. The patients presenting problems have been discussed, and they are in agreement with the care plan formulated and outlined with them. I have encouraged them to ask questions as they arise throughout their visit. Disposition:  DISCHARGE NOTE  2:41 AM  The patient has been re-evaluated and is ready for discharge. Reviewed available results with patient. Counseled pt on diagnosis and care plan. Pt has expressed understanding, and all questions have been answered. Pt agrees with plan and agrees to F/U as recommended, or return to the ED if their sxs worsen. Discharge instructions have been provided and explained to the pt, along with reasons to return to the ED. Written by Tenzin Almeida ED Scribe, as dictated by Anahi Rios MD.     PLAN:  1. Current Discharge Medication List      START taking these medications    Details   amoxicillin 500 mg tab Take 500 mg by mouth three (3) times daily for 10 days.   Qty: 30 Tab, Refills: 0 2.   Follow-up Information     Follow up With Details Comments Contact Info    None   None ([phone]) Patient stated that they have no PCP          Return to ED if worse     Diagnosis     Clinical Impression:   1. Acute streptococcal tonsillitis, not specified as recurrent or not        Attestations: This note is prepared by Ramirez Parker, acting as Scribe for Gwendolyn Hay MD.    Gwendolyn Hay MD: The scribe's documentation has been prepared under my direction and personally reviewed by me in its entirety. I confirm that the note above accurately reflects all work, treatment, procedures, and medical decision making performed by me.

## 2018-03-24 NOTE — ED NOTES
Pt presents ambulatory to ED complaining of sore throat and right earache x1 day. Pt is alert and oriented x 4, RR even and unlabored, skin is warm and dry. Assesment completed and pt updated on plan of care. Emergency Department Nursing Plan of Care       The Nursing Plan of Care is developed from the Nursing assessment and Emergency Department Attending provider initial evaluation. The plan of care may be reviewed in the ED Provider note.     The Plan of Care was developed with the following considerations:   Patient / Family readiness to learn indicated by:verbalized understanding  Persons(s) to be included in education: patient  Barriers to Learning/Limitations:No    Signed     Pia Allen RN    3/24/2018   2:19 AM

## 2018-03-27 ENCOUNTER — HOSPITAL ENCOUNTER (EMERGENCY)
Age: 38
Discharge: HOME | End: 2018-03-27
Attending: EMERGENCY MEDICINE
Payer: SELF-PAY

## 2018-03-27 ENCOUNTER — APPOINTMENT (OUTPATIENT)
Dept: CT IMAGING | Age: 38
End: 2018-03-27
Attending: PHYSICIAN ASSISTANT
Payer: SELF-PAY

## 2018-03-27 VITALS
HEART RATE: 81 BPM | TEMPERATURE: 98.5 F | WEIGHT: 143 LBS | RESPIRATION RATE: 16 BRPM | BODY MASS INDEX: 21.18 KG/M2 | DIASTOLIC BLOOD PRESSURE: 89 MMHG | HEIGHT: 69 IN | SYSTOLIC BLOOD PRESSURE: 149 MMHG | OXYGEN SATURATION: 98 %

## 2018-03-27 DIAGNOSIS — J02.9 ACUTE PHARYNGITIS, UNSPECIFIED ETIOLOGY: Primary | ICD-10-CM

## 2018-03-27 LAB
ANION GAP SERPL CALC-SCNC: 9 MMOL/L (ref 5–15)
BUN SERPL-MCNC: 12 MG/DL (ref 6–20)
BUN/CREAT SERPL: 11 (ref 12–20)
CALCIUM SERPL-MCNC: 9.6 MG/DL (ref 8.5–10.1)
CHLORIDE SERPL-SCNC: 101 MMOL/L (ref 97–108)
CO2 SERPL-SCNC: 27 MMOL/L (ref 21–32)
CREAT SERPL-MCNC: 1.12 MG/DL (ref 0.7–1.3)
GLUCOSE SERPL-MCNC: 78 MG/DL (ref 65–100)
POTASSIUM SERPL-SCNC: 3.4 MMOL/L (ref 3.5–5.1)
SODIUM SERPL-SCNC: 137 MMOL/L (ref 136–145)

## 2018-03-27 PROCEDURE — 99283 EMERGENCY DEPT VISIT LOW MDM: CPT

## 2018-03-27 PROCEDURE — 96374 THER/PROPH/DIAG INJ IV PUSH: CPT

## 2018-03-27 PROCEDURE — 74011000258 HC RX REV CODE- 258: Performed by: EMERGENCY MEDICINE

## 2018-03-27 PROCEDURE — 80048 BASIC METABOLIC PNL TOTAL CA: CPT | Performed by: PHYSICIAN ASSISTANT

## 2018-03-27 PROCEDURE — 74011250636 HC RX REV CODE- 250/636: Performed by: PHYSICIAN ASSISTANT

## 2018-03-27 PROCEDURE — 36415 COLL VENOUS BLD VENIPUNCTURE: CPT | Performed by: PHYSICIAN ASSISTANT

## 2018-03-27 PROCEDURE — 70491 CT SOFT TISSUE NECK W/DYE: CPT

## 2018-03-27 PROCEDURE — 74011636320 HC RX REV CODE- 636/320: Performed by: EMERGENCY MEDICINE

## 2018-03-27 RX ORDER — KETOROLAC TROMETHAMINE 30 MG/ML
30 INJECTION, SOLUTION INTRAMUSCULAR; INTRAVENOUS
Status: COMPLETED | OUTPATIENT
Start: 2018-03-27 | End: 2018-03-27

## 2018-03-27 RX ORDER — DEXAMETHASONE 4 MG/1
8 TABLET ORAL
Status: COMPLETED | OUTPATIENT
Start: 2018-03-27 | End: 2018-03-27

## 2018-03-27 RX ORDER — SODIUM CHLORIDE 0.9 % (FLUSH) 0.9 %
10 SYRINGE (ML) INJECTION
Status: COMPLETED | OUTPATIENT
Start: 2018-03-27 | End: 2018-03-27

## 2018-03-27 RX ADMIN — SODIUM CHLORIDE 100 ML: 900 INJECTION, SOLUTION INTRAVENOUS at 14:22

## 2018-03-27 RX ADMIN — IOPAMIDOL 100 ML: 612 INJECTION, SOLUTION INTRAVENOUS at 14:22

## 2018-03-27 RX ADMIN — KETOROLAC TROMETHAMINE 30 MG: 30 INJECTION, SOLUTION INTRAMUSCULAR at 13:50

## 2018-03-27 RX ADMIN — Medication 10 ML: at 14:22

## 2018-03-27 RX ADMIN — DEXAMETHASONE 8 MG: 4 TABLET ORAL at 13:50

## 2018-03-27 NOTE — ED PROVIDER NOTES
HPI Comments: 30year old male hx sickle cell anemia, asthma presenting for sore throat. Pt reports several days of right sided sore throat and right ear pain. Constant and progressive. Notes that he cannot eat. Is able to take fluids. Went to [facility] on 3/24 - diagnosed by exam with strep pharyngitis and started on amox. No relief. Taking ibuprofen, 200mg per dose, at home with no relief. No cough or congestion. Also notes pain in the tongue. No difficulty breathing, CP, or other concerns. PMHx: as above  Social: + tobacco use. Patient is a 30 y.o. male presenting with sore throat and ear pain. The history is provided by the patient and medical records. Sore Throat    Associated symptoms include ear pain. Pertinent negatives include no diarrhea, no vomiting, no congestion and no shortness of breath. Ear Pain    Associated symptoms include sore throat. Pertinent negatives include no diarrhea and no vomiting. Past Medical History:   Diagnosis Date    Asthma     Sickle cell anemia ([facility] [address] 7/5/16    Diagnosed as child       History reviewed. No pertinent surgical history. History reviewed. No pertinent family history. Social History     Social History    Marital status: SINGLE     Spouse name: N/A    Number of children: N/A    Years of education: N/A     Occupational History    Not on file. Social History Main Topics    Smoking status: Current Every Day Smoker     Packs/day: 0.50     Years: 17.00    Smokeless tobacco: Not on file    Alcohol use 8.4 oz/week     14 Cans of beer per week    Drug use: Yes     Special: Marijuana      Comment: July 2016, reports history of marijuana use    Sexual activity: Not on file     Other Topics Concern    Not on file     Social History Narrative         ALLERGIES: Review of patient's allergies indicates no known allergies. Review of Systems   Constitutional: Negative for fever. HENT: Positive for ear pain and sore throat.  Negative for congestion. Eyes: Negative for discharge. Respiratory: Negative for shortness of breath. Cardiovascular: Negative for chest pain. Gastrointestinal: Negative for diarrhea and vomiting. Musculoskeletal: Negative for neck stiffness. Skin: Negative for wound. Neurological: Negative for syncope. All other systems reviewed and are negative. Vitals:    03/27/18 1239   BP: 149/89   Pulse: 81   Resp: 16   Temp: 98.5 °F (36.9 °C)   SpO2: 98%   Weight: 64.9 kg (143 lb)   Height: 5' 9\" (1.753 m)            Physical Exam   Constitutional: He is oriented to person, place, and time. He appears well-developed and well-nourished. No distress. AA male no distress   HENT:   Head: Normocephalic and atraumatic. Right Ear: External ear normal.   Left Ear: External ear normal.   Shallow aphthous ulcer noted to the right tongue  Normal TMs  Minimal pharyngeal erythema without edema  Midline uvula  No trismus, stridor, or drooling   Eyes: Conjunctivae are normal. No scleral icterus. Neck: Normal range of motion. Neck supple. No tracheal deviation present. Cardiovascular: Normal rate, regular rhythm and normal heart sounds. Exam reveals no gallop and no friction rub. No murmur heard. Pulmonary/Chest: Effort normal and breath sounds normal. No stridor. No respiratory distress. He has no wheezes. Abdominal: Soft. He exhibits no distension. There is no tenderness. Musculoskeletal: Normal range of motion. Lymphadenopathy:     He has no cervical adenopathy. Neurological: He is alert and oriented to person, place, and time. Skin: Skin is warm and dry. Psychiatric: He has a normal mood and affect. His behavior is normal.   Nursing note and vitals reviewed. MDM  Number of Diagnoses or Management Options  Acute pharyngitis, unspecified etiology:   Diagnosis management comments: 30year old male presenting for severe, persistent, unilateral sore throat.   Largely normal exam.  Aphthous ulcer noted to tongue. CT obtained given persistence and unilateral nature of symptoms showed slight asymmetry in tonsils,. No fluid collection or mass effect. Decadron given in ED. Pt given return precautions, ENT f/u.          Amount and/or Complexity of Data Reviewed  Clinical lab tests: ordered and reviewed  Tests in the radiology section of CPT®: ordered and reviewed  Discuss the patient with other providers: yes (Dr. Santizo Mt, ED attending)          ED Course       Procedures

## 2018-03-27 NOTE — ED NOTES
Patient given discharge instructions and prescriptions by provider and verbalized understanding. Pt ambulatory out of ER with steady gait.

## 2018-03-27 NOTE — ED NOTES
12:36 PM  I have evaluated the patient as the Provider in Triage. I have reviewed His vital signs and the triage nurse assessment. I have talked with the patient and any available family and advised that I am the provider in triage and have ordered the appropriate study to initiate their work up based on the clinical presentation during my assessment. I have advised that the patient will be accommodated in the Main ED as soon as possible. I have also requested to contact the triage nurse or myself immediately if the patient experiences any changes in their condition during this brief waiting period. Pt here  For right sided sore throat, tongue, and ear pain. Taking amox Rx at [address] - no test done. States cannot eat. Hurts to drink - able to swallow.       SAROJ Garcia

## 2018-03-27 NOTE — ED TRIAGE NOTES
Seen at [facility] earlier this week and diagnosed with Strep and tonsillitis. Taking antibiotics but states he is still not feeling better. C/o right sore throat and right ear ache. Took 200mg ibuprofen with no relief. States painful to swallow, difficulty eating r/t pain.

## 2018-03-27 NOTE — DISCHARGE INSTRUCTIONS
Sore Throat: Care Instructions  Your Care Instructions    Infection by bacteria or a virus causes most sore throats. Cigarette smoke, dry air, air pollution, allergies, and yelling can also cause a sore throat. Sore throats can be painful and annoying. Fortunately, most sore throats go away on their own. If you have a bacterial infection, your doctor may prescribe antibiotics. Follow-up care is a key part of your treatment and safety. Be sure to make and go to all appointments, and call your doctor if you are having problems. It's also a good idea to know your test results and keep a list of the medicines you take. How can you care for yourself at home? · If your doctor prescribed antibiotics, take them as directed. Do not stop taking them just because you feel better. You need to take the full course of antibiotics. · Gargle with warm salt water once an hour to help reduce swelling and relieve discomfort. Use 1 teaspoon of salt mixed in 1 cup of warm water. · Take an over-the-counter pain medicine, such as acetaminophen (Tylenol), ibuprofen (Advil, Motrin), or naproxen (Aleve). Read and follow all instructions on the label. · Be careful when taking over-the-counter cold or flu medicines and Tylenol at the same time. Many of these medicines have acetaminophen, which is Tylenol. Read the labels to make sure that you are not taking more than the recommended dose. Too much acetaminophen (Tylenol) can be harmful. · Drink plenty of fluids. Fluids may help soothe an irritated throat. Hot fluids, such as tea or soup, may help decrease throat pain. · Use over-the-counter throat lozenges to soothe pain. Regular cough drops or hard candy may also help. These should not be given to young children because of the risk of choking. · Do not smoke or allow others to smoke around you. If you need help quitting, talk to your doctor about stop-smoking programs and medicines.  These can increase your chances of quitting for good. · Use a vaporizer or humidifier to add moisture to your bedroom. Follow the directions for cleaning the machine. When should you call for help? Call your doctor now or seek immediate medical care if:  ? · You have new or worse trouble swallowing. ? · Your sore throat gets much worse on one side. ? Watch closely for changes in your health, and be sure to contact your doctor if you do not get better as expected. Where can you learn more? Go to http://lito-gaviota.info/. Enter [phone] in the search box to learn more about \"Sore Throat: Care Instructions. \"  Current as of: May 12, 2017  Content Version: 11.4  © 4964-9429 [employer]. Care instructions adapted under license by [employer] (which disclaims liability or warranty for this information). If you have questions about a medical condition or this instruction, always ask your healthcare professional. [address] any warranty or liability for your use of this information.

## 2018-07-30 ENCOUNTER — HOSPITAL ENCOUNTER (EMERGENCY)
Age: 38
Discharge: HOME | End: 2018-07-30
Attending: EMERGENCY MEDICINE
Payer: SELF-PAY

## 2018-07-30 VITALS
HEIGHT: 71 IN | WEIGHT: 140 LBS | TEMPERATURE: 98.5 F | BODY MASS INDEX: 19.6 KG/M2 | DIASTOLIC BLOOD PRESSURE: 74 MMHG | HEART RATE: 72 BPM | OXYGEN SATURATION: 99 % | RESPIRATION RATE: 16 BRPM | SYSTOLIC BLOOD PRESSURE: 110 MMHG

## 2018-07-30 DIAGNOSIS — S09.90XA CLOSED HEAD INJURY, INITIAL ENCOUNTER: ICD-10-CM

## 2018-07-30 DIAGNOSIS — S01.01XA LACERATION OF SCALP, INITIAL ENCOUNTER: Primary | ICD-10-CM

## 2018-07-30 DIAGNOSIS — S09.90XA INJURY OF HEAD, INITIAL ENCOUNTER: ICD-10-CM

## 2018-07-30 PROCEDURE — 75810000293 HC SIMP/SUPERF WND  RPR

## 2018-07-30 PROCEDURE — 77030008460 HC STPLR SKN PRECIS 3M -A

## 2018-07-30 PROCEDURE — 77030018836 HC SOL IRR NACL ICUM -A

## 2018-07-30 PROCEDURE — 99282 EMERGENCY DEPT VISIT SF MDM: CPT

## 2018-07-30 RX ORDER — BACITRACIN 500 [USP'U]/G
OINTMENT TOPICAL
Qty: 1 TUBE | Refills: 0 | OUTPATIENT
Start: 2018-07-30 | End: 2020-06-23

## 2018-07-30 NOTE — DISCHARGE INSTRUCTIONS
Thank you! You have two staples that need to be removed in 5-7 days. Thank you for allowing us to provide you with excellent care today. We hope we addressed all of your concerns and needs. We strive to provide excellent quality care in the Emergency Department. You may receive a survey after your visit to evaluate the care you were provided. Should you receive a survey from us, we invite you to share your experience and tell us what made it excellent. It was a pleasure serving you, we invite you to share your experience with us, in our pursuit for excellence, should you be selected to receive a survey. If you feel that you have not received excellent quality care or timely care, please ask to speak to the nurse manager. Please choose us in the future for your continued health care needs. ------------------------------------------------------------------------------------------------------------  The exam and treatment you received in the Emergency Department were for an urgent problem and are not intended as complete care. It is important that you follow up with a doctor, nurse practitioner, or physician assistant for ongoing care. If your symptoms become worse or you do not improve as expected and you are unable to reach your usual health care provider, you should return to the Emergency Department. We are available 24 hours a day. Please take your discharge instructions with you when you go to your follow-up appointment. If you have any problem arranging a follow-up appointment, contact the Emergency Department immediately. If a prescription has been provided, please have it filled as soon as possible to prevent a delay in treatment. Read the entire medication instruction sheet provided to you by the pharmacy.  If you have any questions or reservations about taking the medication due to side effects or interactions with other medications, please call your primary care physician or contact the ER to speak with the charge nurse. Make an appointment with your family doctor or the physician you were referred to for follow-up of this visit as instructed on your discharge paperwork, as this is mandatory follow-up. Return to the ER if you are unable to be seen or if you are unable to be seen in a timely manner. If you have any problem arranging the follow-up visit, contact the Emergency Department immediately. Learning About a Closed Head Injury  What is a closed head injury? A closed head injury happens when your head gets hit hard. The strong force of the blow causes your brain to shake in your skull. This movement can cause the brain to bruise, swell, or tear. Sometimes nerves or blood vessels also get damaged. This can cause bleeding in or around the brain. A concussion is a type of closed head injury. What are the symptoms? If you have a mild concussion, you may have a mild headache or feel \"not quite right. \" These symptoms are common. They usually go away over a few days to 4 weeks. But sometimes after a concussion, you feel like you can't function as well as before the injury. And you have new symptoms. This is called postconcussive syndrome. You may:  · Find it harder to solve problems, think, concentrate, or remember. · Have headaches. · Have changes in your sleep patterns, such as not being able to sleep or sleeping all the time. · Have changes in your personality. · Not be interested in your usual activities. · Feel angry or anxious without a clear reason. · Lose your sense of taste or smell. · Be dizzy, lightheaded, or unsteady. It may be hard to stand or walk. How is a closed head injury treated? Any person who may have a concussion needs to see a doctor. Some people have to stay in the hospital to be watched. Others can go home safely. If you go home, follow your doctor's instructions.  He or she will tell you if you need someone to watch you closely for the next 24 hours or longer. Rest is the best treatment. Get plenty of sleep at night. And try to rest during the day. · Avoid activities that are physically or mentally demanding. These include housework, exercise, and schoolwork. And don't play video games, send text messages, or use the computer. You may need to change your school or work schedule to be able to avoid these activities. · Ask your doctor when it's okay to drive, ride a bike, or operate machinery. · Take an over-the-counter pain medicine, such as acetaminophen (Tylenol), ibuprofen (Advil, Motrin), or naproxen (Aleve). Be safe with medicines. Read and follow all instructions on the label. · Check with your doctor before you use any other medicines for pain. · Do not drink alcohol or use illegal drugs. They can slow recovery. They can also increase your risk of getting a second head injury. Follow-up care is a key part of your treatment and safety. Be sure to make and go to all appointments, and call your doctor if you are having problems. It's also a good idea to know your test results and keep a list of the medicines you take. Where can you learn more? Go to http://lito-gaviota.info/. Enter E235 in the search box to learn more about \"Learning About a Closed Head Injury. \"  Current as of: October 9, 2017  Content Version: 11.7  © 6410-1621 [employer]. Care instructions adapted under license by [employer] (which disclaims liability or warranty for this information). If you have questions about a medical condition or this instruction, always ask your healthcare professional. [address] any warranty or liability for your use of this information. Cuts: Care Instructions  Your Care Instructions  A cut can happen anywhere on your body.   Stitches, staples, skin adhesives, or pieces of tape called Steri-Strips are sometimes used to keep the edges of a cut together and help it heal. Steri-Strips can be used by themselves or with stitches or staples. Sometimes cuts are left open. If the cut went deep and through the skin, the doctor may have closed the cut in two layers. A deeper layer of stitches brings the deep part of the cut together. These stitches will dissolve and don't need to be removed. The upper layer closure, which could be stitches, staples, Steri-Strips, or adhesive, is what you see on the cut. A cut is often covered by a bandage. The doctor has checked you carefully, but problems can develop later. If you notice any problems or new symptoms, get medical treatment right away. Follow-up care is a key part of your treatment and safety. Be sure to make and go to all appointments, and call your doctor if you are having problems. It's also a good idea to know your test results and keep a list of the medicines you take. How can you care for yourself at home? If a cut is open or closed  · Prop up the sore area on a pillow anytime you sit or lie down during the next 3 days. Try to keep it above the level of your heart. This will help reduce swelling. · Keep the cut dry for the first 24 to 48 hours. After this, you can shower if your doctor okays it. Pat the cut dry. · Don't soak the cut, such as in a bathtub. Your doctor will tell you when it's safe to get the cut wet. · After the first 24 to 48 hours, clean the cut with soap and water 2 times a day unless your doctor gives you different instructions. ¨ Don't use hydrogen peroxide or alcohol, which can slow healing. ¨ You may cover the cut with a thin layer of petroleum jelly and a nonstick bandage. ¨ If the doctor put a bandage over the cut, put on a new bandage after cleaning the cut or if the bandage gets wet or dirty. · Avoid any activity that could cause your cut to reopen. · Be safe with medicines. Read and follow all instructions on the label.   ¨ If the doctor gave you a prescription medicine for pain, take it as prescribed. ¨ If you are not taking a prescription pain medicine, ask your doctor if you can take an over-the-counter medicine. If the cut is closed with stitches, staples, or Steri-Strips  · Follow the above instructions for open or closed cuts. · Do not remove the stitches or staples on your own. Your doctor will tell you when to come back to have the stitches or staples removed. · Leave Steri-Strips on until they fall off. If the cut is closed with a skin adhesive  · Follow the above instructions for open or closed cuts. · Leave the skin adhesive on your skin until it falls off on its own. This may take 5 to 10 days. · Do not scratch, rub, or pick at the adhesive. · Do not put the sticky part of a bandage directly on the adhesive. · Do not put any kind of ointment, cream, or lotion over the area. This can make the adhesive fall off too soon. Do not use hydrogen peroxide or alcohol, which can slow healing. When should you call for help? Call your doctor now or seek immediate medical care if:    · You have new pain, or your pain gets worse.     · The skin near the cut is cold or pale or changes color.     · You have tingling, weakness, or numbness near the cut.     · The cut starts to bleed, and blood soaks through the bandage. Oozing small amounts of blood is normal.     · You have trouble moving the area near the cut.     · You have symptoms of infection, such as:  ¨ Increased pain, swelling, warmth, or redness around the cut. ¨ Red streaks leading from the cut. ¨ Pus draining from the cut. ¨ A fever.    Watch closely for changes in your health, and be sure to contact your doctor if:    · The cut reopens.     · You do not get better as expected. Where can you learn more? Go to http://lito-gaviota.info/. Enter M735 in the search box to learn more about \"Cuts: Care Instructions. \"  Current as of: November 20, 2017  Content Version: 11.7  © 9853-6154 [employer]. Care instructions adapted under license by [employer] (which disclaims liability or warranty for this information). If you have questions about a medical condition or this instruction, always ask your healthcare professional. [address] any warranty or liability for your use of this information.

## 2018-07-30 NOTE — ED PROVIDER NOTES
EMERGENCY DEPARTMENT HISTORY AND PHYSICAL EXAM 
 
 
Date: 7/30/2018 Patient Name: Claudell Register History of Presenting Illness Chief Complaint Patient presents with  Laceration  
  to left side of scalp, pt reports he hit his head on a bed, tetanus UTD History Provided By: Patient HPI: Claudell Register, 41 y.o. male with PMHx significant for sickle cell anemia and asthma who presents ambulatory to the ED with cc of head laceration that occurred a couple hours PTA. Pt reports associated mild HA. He states that he dressed his wound with management of his bleeding symptoms. Pt states that he was fixing his daughter bed when he hit his head. He denies any LOC. Pt states that his tetanus vaccination is UTD. Pt denies any nausea, vomiting, vision change, fevers, or chills. There are no other complaints, changes, or physical findings at this time. PCP: None Current Outpatient Prescriptions Medication Sig Dispense Refill  bacitracin (BACITRACIN) 500 unit/gram oint Apply TID PRN 1 Tube 0 Past History Past Medical History: 
Past Medical History:  
Diagnosis Date  Asthma  Sickle cell anemia ([facility] [address] [birth year] Diagnosed as child Past Surgical History: 
History reviewed. No pertinent surgical history. Family History: 
History reviewed. No pertinent family history. Social History: 
Social History Substance Use Topics  Smoking status: Current Every Day Smoker Packs/day: 0.50 Years: 17.00  Smokeless tobacco: Never Used  Alcohol use 8.4 oz/week 14 Cans of beer per week Allergies: 
No Known Allergies Review of Systems Review of Systems Constitutional: Negative. Negative for chills and fever. HENT: Negative. Negative for congestion, facial swelling, rhinorrhea, sore throat, trouble swallowing and voice change. Eyes: Negative. Respiratory: Negative. Negative for apnea, cough, chest tightness, shortness of breath and wheezing. Cardiovascular: Negative. Negative for chest pain, palpitations and leg swelling. Gastrointestinal: Negative. Negative for abdominal distention, abdominal pain, blood in stool, constipation, diarrhea, nausea and vomiting. Endocrine: Negative. Negative for cold intolerance, heat intolerance and polyuria. Genitourinary: Negative. Negative for difficulty urinating, dysuria, flank pain, frequency, hematuria and urgency. Musculoskeletal: Negative. Negative for arthralgias, back pain, myalgias, neck pain and neck stiffness. Skin: Positive for wound (head). Negative for color change and rash. Neurological: Positive for headaches. Negative for dizziness, syncope, facial asymmetry, speech difficulty, weakness, light-headedness and numbness. Hematological: Negative. Does not bruise/bleed easily. Psychiatric/Behavioral: Negative. Negative for confusion and self-injury. The patient is not nervous/anxious. Physical Exam  
Physical Exam  
Constitutional: He is oriented to person, place, and time. Vital signs are normal. He appears well-developed and well-nourished. He is cooperative. Non-toxic appearance. HENT:  
Head: Normocephalic and atraumatic. Mouth/Throat: Mucous membranes are normal. No posterior oropharyngeal erythema. Eyes: Conjunctivae and EOM are normal. Pupils are equal, round, and reactive to light. Neck: Normal range of motion. Cardiovascular: Normal rate, regular rhythm, normal heart sounds and intact distal pulses. Exam reveals no gallop and no friction rub. No murmur heard. Pulmonary/Chest: Effort normal and breath sounds normal. No respiratory distress. He has no wheezes. He has no rales. He exhibits no tenderness. Abdominal: Soft. Bowel sounds are normal. He exhibits no distension and no mass. There is no tenderness. There is no rebound and no guarding. Musculoskeletal: Normal range of motion. He exhibits no edema, tenderness or deformity.   
Neurological: He is alert and oriented to person, place, and time. He displays normal reflexes. No cranial nerve deficit. He exhibits normal muscle tone. Coordination normal.  
Skin: Skin is warm. No rash noted. 1cm linear laceration to left parietal region, superficial, no exposed muscle or bone; no active bleeding. Psychiatric: He has a normal mood and affect. Nursing note and vitals reviewed. Medical Decision Making I am the first provider for this patient. I reviewed the vital signs, available nursing notes, past medical history, past surgical history, family history and social history. Vital Signs-Reviewed the patient's vital signs. Patient Vitals for the past 12 hrs: 
 Temp Pulse Resp BP SpO2  
07/30/18 0057 98.5 °F (36.9 °C) 72 16 110/74 99 % Records Reviewed: Nursing Notes, Old Medical Records, Previous Radiology Studies and Previous Laboratory Studies Provider Notes (Medical Decision Making): DDx: closed head injury, superficial laceration, abrasion 41 y.o. M presenting with left parietal laceration after closed head injury; pt is neuro intact; no active bleeding; CT head not indicated at this time; will perform bedside repair of scalp laceration; tetanus UTD. ED Course:  
Initial assessment performed. The patients presenting problems have been discussed, and they are in agreement with the care plan formulated and outlined with them. I have encouraged them to ask questions as they arise throughout their visit. TOBACCO COUNSELING: 
Upon evaluation, pt expressed that they are a current tobacco user. For approximately 10 minutes, pt has been counseled on the dangers of smoking and was encouraged to quit as soon as possible in order to decrease further risks to their health. Pt has conveyed their understanding of the risks involved should they continue to use tobacco products. Procedure Note - Laceration Repair: 
1:52 AM 
Procedure by Kara Fabry, MD. Complexity: Simple 1.5 cm linear laceration to scalp was irrigated copiously with NS under jet lavage, prepped with Hibiclens and draped in a sterile fashion. The wound was explored with the following results: No foreign bodies found. The wound was repaired with 2 staples. The wound was closed with good hemostasis and approximation. Sterile dressing applied. Estimated blood loss: 0cc The procedure took 1-15 minutes, and pt tolerated well. Written by Patrice Erickson ED scribe, as dictated by Ajay Thapa MD. 
 
Progress Note: 
Pt states he feels much better; pain resolved; denies any nausea; no new symptoms; pt able to tolerate PO and ambulate without issues; pt clinically safe for discharge home with close PCP f/u. At time of discharge, pt had stable vitals and had no questions or concerns, and was very satisfied with overall care. Disposition: 
 
DISCHARGE NOTE 
2:04 AM 
The patient has been re-evaluated and is ready for discharge. Reviewed available results with patient. Counseled patient on diagnosis and care plan. Patient has expressed understanding, and all questions have been answered. Patient agrees with plan and agrees to follow up as recommended, or return to the ED if their symptoms worsen. Discharge instructions have been provided and explained to the patient, along with reasons to return to the ED. PLAN: 
1. There are no discharge medications for this patient. No current facility-administered medications for this encounter. Current Outpatient Prescriptions:  
  bacitracin (BACITRACIN) 500 unit/gram oint, Apply TID PRN, Disp: 1 Tube, Rfl: 0 
 
2. Follow-up Information Follow up With Details Comments Contact Info None   None ([phone]) Patient stated that they have no PCP 
  
 [facility] EMERGENCY DEPT  As needed, If symptoms worsen [address] Return to ED if worse Diagnosis Clinical Impression: 1. Laceration of scalp, initial encounter 2.  Injury of head, initial encounter 3. Closed head injury, initial encounter Attestations: This note is prepared by Cony Payan, acting as Scribe for Harini Kim MD. Harini Kim MD: The scribe's documentation has been prepared under my direction and personally reviewed by me in its entirety. I confirm that the note above accurately reflects all work, treatment, procedures, and medical decision making performed by me. This note will not be viewable in [address]

## 2018-07-30 NOTE — LETTER
[facility] EMERGENCY DEPT 
[address] [facility] [address] [id number] [phone] Work/School Note Date: 7/30/2018 To Whom It May concern: 
 
Calista Schmidt was seen and treated today in the emergency room by the following provider(s): 
Attending Provider: Mary Agosto MD. Calista Schmidt may return to work on 07/31/2018. Sincerely, Mary Agosto MD

## 2018-08-05 ENCOUNTER — HOSPITAL ENCOUNTER (EMERGENCY)
Age: 38
Discharge: HOME | End: 2018-08-05
Attending: INTERNAL MEDICINE
Payer: SELF-PAY

## 2018-08-05 VITALS
BODY MASS INDEX: 20.73 KG/M2 | TEMPERATURE: 98.5 F | DIASTOLIC BLOOD PRESSURE: 76 MMHG | WEIGHT: 140 LBS | HEIGHT: 69 IN | SYSTOLIC BLOOD PRESSURE: 126 MMHG | OXYGEN SATURATION: 99 % | RESPIRATION RATE: 14 BRPM | HEART RATE: 87 BPM

## 2018-08-05 DIAGNOSIS — Z48.02 ENCOUNTER FOR STAPLE REMOVAL: Primary | ICD-10-CM

## 2018-08-05 PROCEDURE — 75810000275 HC EMERGENCY DEPT VISIT NO LEVEL OF CARE

## 2018-08-05 NOTE — DISCHARGE INSTRUCTIONS
Learning About Stitches and Staples Removal  When are stitches and staples removed? Your doctor will tell you when to have your stitches or staples removed, usually in 7 to 14 days. How long you'll be told to wait will depend on things like where the wound is located, how big and how deep the wound is, and what your general health is like. Do not remove the stitches on your own. Stitches on the face are usually removed within a week. But stitches and staples on other areas of the body, such as on the back or belly or over a joint, may need to stay in place longer, often a week or two. Be sure to follow your doctor's instructions. How are stitches and staples removed? It usually doesn't hurt when the doctor removes the stitches or staples. You may feel a tug as each stitch or staple is removed. · You will either be seated or lying down. · To remove stitches, the doctor will use scissors to cut each of the knots and then pull the threads out. · To remove staples, the doctor will use a tool to take out the staples one at a time. · The area may still feel tender after the stitches or staples are gone. But it should feel better within a few minutes or up to a few hours. What can you expect after stitches and staples are removed? Depending on the type and location of the cut, you will have a scar. Scars usually fade over time. Keep the area clean, but you won't need a bandage. When should you call for help? Call your doctor now or seek immediate medical care if :  · You have new pain, or your pain gets worse. · You have trouble moving the area near the scar. · You have symptoms of infection, such as:  ¨ Increased pain, swelling, warmth, or redness around the scar. ¨ Red streaks leading from the scar. ¨ Pus draining from the scar. ¨ A fever. Watch closely for changes in your health, and be sure to contact your doctor if:  · The scar opens. · You do not get better as expected.   Follow-up care is a key part of your treatment and safety. Be sure to make and go to all appointments, and call your doctor if you do not get better as expected. It's also a good idea to keep a list of the medicines you take. Where can you learn more? Go to http://lito-gaviota.info/. Enter V465 in the search box to learn more about \"Learning About Stitches and Staples Removal.\"  Current as of: November 20, 2017  Content Version: 11.7  © 8042-8736 [employer]. Care instructions adapted under license by [employer] (which disclaims liability or warranty for this information). If you have questions about a medical condition or this instruction, always ask your healthcare professional. [address] any warranty or liability for your use of this information.

## 2018-08-06 NOTE — ED PROVIDER NOTES
EMERGENCY DEPARTMENT HISTORY AND PHYSICAL EXAM    Date: 8/5/2018  Patient Name: Lesly Calle    History of Presenting Illness     Chief Complaint   Patient presents with    Wound Check     Pt here for wound check          History Provided By: Patient    HPI: Lesly Calle is a 41 y.o. male with a PMH of No significant past medical history who presents with scalp staples . Here  For removal. Staples placed in this ED patient has no complaints. Because there are no symptoms or complaints of pain, there is no reported quality, severity, modifying factors, or associated signs and symptoms reported. PCP: None    Current Outpatient Prescriptions   Medication Sig Dispense Refill    bacitracin (BACITRACIN) 500 unit/gram oint Apply TID PRN 1 Tube 0       Past History     Past Medical History:  Past Medical History:   Diagnosis Date    Asthma     Sickle cell anemia ([email or web address] 7/5/16    Diagnosed as child       Past Surgical History:  History reviewed. No pertinent surgical history. Family History:  History reviewed. No pertinent family history. Social History:  Social History   Substance Use Topics    Smoking status: Current Every Day Smoker     Packs/day: 0.50     Years: 17.00    Smokeless tobacco: Never Used    Alcohol use 8.4 oz/week     14 Cans of beer per week       Allergies:  No Known Allergies      Review of Systems   Review of Systems   HENT: Negative for congestion. Respiratory: Negative for shortness of breath. Cardiovascular: Negative for chest pain. Gastrointestinal: Negative for abdominal pain. Musculoskeletal: Negative for back pain and myalgias. Skin: Positive for wound. Negative for rash. Neurological: Negative for dizziness and headaches. All other systems reviewed and are negative.       Physical Exam     Vitals:    08/05/18 1524   BP: 126/76   Pulse: 87   Resp: 14   Temp: 98.5 °F (36.9 °C)   SpO2: 99%   Weight: 63.5 kg (140 lb)   Height: 5' 9\" (1.753 m)     Physical Exam Constitutional: He is oriented to person, place, and time. He appears well-developed and well-nourished. HENT:   Head: Normocephalic and atraumatic. Right Ear: External ear normal.   Left Ear: External ear normal.   Eyes: Conjunctivae are normal. Right eye exhibits no discharge. Left eye exhibits no discharge. Neck: Normal range of motion. Neck supple. Cardiovascular: Normal rate, regular rhythm and normal heart sounds. Pulmonary/Chest: Effort normal and breath sounds normal. No respiratory distress. He has no wheezes. Abdominal: Soft. Bowel sounds are normal. There is no tenderness. Musculoskeletal: Normal range of motion. He exhibits no edema. Lymphadenopathy:     He has no cervical adenopathy. Neurological: He is alert and oriented to person, place, and time. No cranial nerve deficit. Skin: Skin is warm and dry. 3 staples occiput   Psychiatric: He has a normal mood and affect. His behavior is normal. Judgment and thought content normal.   Nursing note and vitals reviewed. Diagnostic Study Results     Labs -   No results found for this or any previous visit (from the past 12 hour(s)). Radiologic Studies -   No orders to display     CT Results  (Last 48 hours)    None        CXR Results  (Last 48 hours)    None            Medical Decision Making   I am the first provider for this patient. I reviewed the vital signs, available nursing notes, past medical history, past surgical history, family history and social history. Vital Signs-Reviewed the patient's vital signs. Records Reviewed: Nursing Notes    ED Course:   stable  Disposition:  home    DISCHARGE NOTE:         Care plan outlined and precautions discussed. Patient has no new complaints, changes, or physical findings. . All medications were reviewed with the patient; will d/c home . All of pt's questions and concerns were addressed.  Patient was instructed and agrees to follow up with PCP, as well as to return to the ED upon further deterioration. Patient is ready to go home. Follow-up Information     Follow up With Details Comments [id number] In 2 days  [address]  [phone]          Discharge Medication List as of 8/5/2018  4:05 PM          Provider Notes (Medical Decision Making):   DDX laceration skin infection staple removal  Procedures:  Suture/Staple Removal  Date/Time: 8/5/2018 3:55 PM  Performed by: Aron Haddad  Authorized by: Aron Haddad     Consent:     Consent obtained:  Verbal    Consent given by:  Patient    Risks discussed:  Wound separation    Alternatives discussed: n/a. Location:     Location: scalp occiput. Procedure details:     Wound appearance:  No signs of infection, good wound healing and clean    Number of staples removed:  3  Post-procedure details:     Patient tolerance of procedure: Tolerated well, no immediate complications            Diagnosis     Clinical Impression:   1.  Encounter for staple removal

## 2018-12-21 ENCOUNTER — HOSPITAL ENCOUNTER (EMERGENCY)
Age: 38
Discharge: HOME | End: 2018-12-22
Attending: EMERGENCY MEDICINE | Admitting: EMERGENCY MEDICINE
Payer: SELF-PAY

## 2018-12-21 VITALS
TEMPERATURE: 99.3 F | DIASTOLIC BLOOD PRESSURE: 81 MMHG | BODY MASS INDEX: 21.47 KG/M2 | RESPIRATION RATE: 16 BRPM | WEIGHT: 150 LBS | HEIGHT: 70 IN | SYSTOLIC BLOOD PRESSURE: 120 MMHG | HEART RATE: 73 BPM | OXYGEN SATURATION: 98 %

## 2018-12-21 DIAGNOSIS — S13.4XXA WHIPLASH INJURY TO NECK, INITIAL ENCOUNTER: Primary | ICD-10-CM
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
## 2018-12-21 PROCEDURE — 99283 EMERGENCY DEPT VISIT LOW MDM: CPT

## 2018-12-21 RX ORDER — CYCLOBENZAPRINE HCL 10 MG
10 TABLET ORAL
Status: COMPLETED | OUTPATIENT
Start: 2018-12-21 | End: 2018-12-22

## 2018-12-21 RX ORDER — HYDROCODONE BITARTRATE AND ACETAMINOPHEN 5; 325 MG/1; MG/1
1 TABLET ORAL
Qty: 5 TAB | Refills: 0 | OUTPATIENT
Start: 2018-12-21 | End: 2020-06-23

## 2018-12-21 RX ORDER — NAPROXEN 500 MG/1
500 TABLET ORAL 2 TIMES DAILY WITH MEALS
Qty: 20 TAB | Refills: 0 | Status: SHIPPED | OUTPATIENT
Start: 2018-12-21 | End: 2018-12-31

## 2018-12-21 RX ORDER — HYDROCODONE BITARTRATE AND ACETAMINOPHEN 5; 325 MG/1; MG/1
2 TABLET ORAL
Status: COMPLETED | OUTPATIENT
Start: 2018-12-21 | End: 2018-12-22

## 2018-12-21 RX ORDER — CYCLOBENZAPRINE HCL 10 MG
10 TABLET ORAL
Qty: 12 TAB | Refills: 0 | OUTPATIENT
Start: 2018-12-21 | End: 2020-06-23

## 2018-12-21 NOTE — LETTER
[facility] EMERGENCY DEPT 
[address] [facility] [address] [id number] 
[phone] Work/School Note Date: 12/21/2018 To Whom It May concern: 
 
Devika Yanez was seen and treated today in the emergency room by the following provider(s): 
Attending Provider: Vel Romano MD. Devika Yanez may return to work on 12/24/2018. Sincerely, Sharma Goldmann

## 2018-12-22 PROCEDURE — 74011250637 HC RX REV CODE- 250/637: Performed by: EMERGENCY MEDICINE

## 2018-12-22 RX ADMIN — CYCLOBENZAPRINE HYDROCHLORIDE 10 MG: 10 TABLET, FILM COATED ORAL at 00:12

## 2018-12-22 RX ADMIN — HYDROCODONE BITARTRATE AND ACETAMINOPHEN 2 TABLET: 5; 325 TABLET ORAL at 00:11
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
## 2018-12-22 NOTE — ED NOTES
Patient has been instructed that they have been given 2 tabs hydrocodone/acetemenphen 5/325 mg which contains opioids, benzodiazepines, or other sedating drugs. Patient is aware that they  will need to refrain from driving or operating heavy machinery after taking this medication. Patient also instructed that they need to avoid drinking alcohol and using other products containing opioids, benzodiazepines, or other sedating drugs. Patient verbalized understanding.

## 2018-12-22 NOTE — DISCHARGE INSTRUCTIONS
Neck Strain: Care Instructions  Your Care Instructions    You have strained the muscles and ligaments in your neck. A sudden, awkward movement can strain the neck. This often occurs with falls or car accidents or during certain sports. Everyday activities like working on a computer or sleeping can also cause neck strain if they force you to hold your neck in an awkward position for a long time. It is common for neck pain to get worse for a day or two after an injury, but it should start to feel better after that. You may have more pain and stiffness for several days before it gets better. This is expected. It may take a few weeks or longer for it to heal completely. Good home treatment can help you get better faster and avoid future neck problems. Follow-up care is a key part of your treatment and safety. Be sure to make and go to all appointments, and call your doctor if you are having problems. It's also a good idea to know your test results and keep a list of the medicines you take. How can you care for yourself at home? · If you were given a neck brace (cervical collar) to limit neck motion, wear it as instructed for as many days as your doctor tells you to. Do not wear it longer than you were told to. Wearing a brace for too long can make neck stiffness worse and weaken the neck muscles. · You can try using heat or ice to see if it helps. ? Try using a heating pad on a low or medium setting for 15 to 20 minutes every 2 to 3 hours. Try a warm shower in place of one session with the heating pad. You can also buy single-use heat wraps that last up to 8 hours. ? You can also try an ice pack for 10 to 15 minutes every 2 to 3 hours. · Take pain medicines exactly as directed. ? If the doctor gave you a prescription medicine for pain, take it as prescribed. ? If you are not taking a prescription pain medicine, ask your doctor if you can take an over-the-counter medicine.   · Gently rub the area to relieve pain and help with blood flow. Do not massage the area if it hurts to do so. · Do not do anything that makes the pain worse. Take it easy for a couple of days. You can do your usual activities if they do not hurt your neck or put it at risk for more stress or injury. · Try sleeping on a special neck pillow. Place it under your neck, not under your head. Placing a tightly rolled-up towel under your neck while you sleep will also work. If you use a neck pillow or rolled towel, do not use your regular pillow at the same time. · To prevent future neck pain, do exercises to stretch and strengthen your neck and back. Learn how to use good posture, safe lifting techniques, and proper body mechanics. When should you call for help? Call 911 anytime you think you may need emergency care. For example, call if:    · You are unable to move an arm or a leg at all.   [facility] your doctor now or seek immediate medical care if:    · You have new or worse symptoms in your arms, legs, chest, belly, or buttocks. Symptoms may include:  ? Numbness or tingling. ? Weakness. ? Pain.     · You lose bladder or bowel control.    Watch closely for changes in your health, and be sure to contact your doctor if:    · You are not getting better as expected. Where can you learn more? Go to http://lito-gaviota.info/. Enter M253 in the search box to learn more about \"Neck Strain: Care Instructions. \"  Current as of: November 29, 2017  Content Version: 11.8  © 9355-9656 [employer]. Care instructions adapted under license by [employer] (which disclaims liability or warranty for this information). If you have questions about a medical condition or this instruction, always ask your healthcare professional. [address] any warranty or liability for your use of this information.            Whiplash: Care Instructions  Your Care Instructions  Whiplash occurs when your head is suddenly forced forward and then snapped backward, as might happen in a car accident or sports injury. This can cause pain and stiffness in your neck. Your head, chest, shoulders, and arms also may hurt. Most whiplash gets better with home care. Your doctor may advise you to take medicine to relieve pain or relax your muscles. He or she may suggest exercise and physical therapy to increase flexibility and relieve pain. You can try wearing a neck (cervical) collar to support your neck. For a while you probably will need to avoid lifting and other activities that can strain the neck. Follow-up care is a key part of your treatment and safety. Be sure to make and go to all appointments, and call your doctor if you are having problems. It's also a good idea to know your test results and keep a list of the medicines you take. How can you care for yourself at home? · Take pain medicines exactly as directed. ? If the doctor gave you a prescription medicine for pain, take it as prescribed. ? If you are not taking a prescription pain medicine, ask your doctor if you can take an over-the-counter medicine. ? Do not take two or more pain medicines at the same time unless the doctor told you to. Many pain medicines have acetaminophen, which is Tylenol. Too much acetaminophen (Tylenol) can be harmful. · You can try using a soft foam collar to support your neck for short periods of time. You can buy one at most [employer]. Do not wear the collar more than 2 or 3 days unless your doctor tells you to. · You can try using heat and ice to see if it helps. ? Try using a heating pad on a low or medium setting for 15 to 20 minutes every 2 to 3 hours. Try a warm shower in place of one session with the heating pad. You can also buy single-use heat wraps that last up to 8 hours. ? You can also try an ice pack for 10 to 15 minutes every 2 to 3 hours. · Do not do anything that makes the pain worse. Take it easy for a couple of days.  You can do your usual activities if they do not hurt your neck or put it at risk for more stress or injury. Avoid lifting, sports, or other activities that might strain your neck. · Try sleeping on a special neck pillow. Place it under your neck, not under your head. Placing a tightly rolled-up towel under your neck while you sleep will also work. If you use a neck pillow or rolled towel, do not use your regular pillow at the same time. · Once your neck pain is gone, do exercises to stretch your neck and back and make them stronger. Your doctor or physical therapist can tell you which exercises are best.  When should you call for help? Call 911 anytime you think you may need emergency care. For example, call if:    · You are unable to move an arm or a leg at all.   [facility] your doctor now or seek immediate medical care if:    · You have new or worse symptoms in your arms, legs, chest, belly, or buttocks. Symptoms may include:  ? Numbness or tingling. ? Weakness. ? Pain.     · You lose bladder or bowel control.    Watch closely for changes in your health, and be sure to contact your doctor if:    · You are not getting better as expected. Where can you learn more? Go to http://lito-gaviota.info/. Enter W618 in the search box to learn more about \"Whiplash: Care Instructions. \"  Current as of: November 29, 2017  Content Version: 11.8  © 7465-7093 [employer]. Care instructions adapted under license by [employer] (which disclaims liability or warranty for this information). If you have questions about a medical condition or this instruction, always ask your healthcare professional. [place] [address] any warranty or liability for your use of this information.

## 2020-06-23 ENCOUNTER — HOSPITAL ENCOUNTER (EMERGENCY)
Age: 40
Discharge: HOME | End: 2020-06-23
Attending: EMERGENCY MEDICINE
Payer: MEDICAID

## 2020-06-23 VITALS
SYSTOLIC BLOOD PRESSURE: 126 MMHG | WEIGHT: 160 LBS | HEART RATE: 86 BPM | DIASTOLIC BLOOD PRESSURE: 85 MMHG | RESPIRATION RATE: 16 BRPM | HEIGHT: 70 IN | OXYGEN SATURATION: 97 % | BODY MASS INDEX: 22.9 KG/M2 | TEMPERATURE: 98.7 F

## 2020-06-23 DIAGNOSIS — K08.89 DENTALGIA: Primary | ICD-10-CM

## 2020-06-23 PROCEDURE — 99282 EMERGENCY DEPT VISIT SF MDM: CPT

## 2020-06-23 RX ORDER — PENICILLIN V POTASSIUM 500 MG/1
500 TABLET, FILM COATED ORAL 4 TIMES DAILY
Qty: 28 TAB | Refills: 0 | Status: SHIPPED | OUTPATIENT
Start: 2020-06-23 | End: 2020-06-30

## 2020-06-23 RX ORDER — IBUPROFEN 800 MG/1
800 TABLET ORAL
Qty: 20 TAB | Refills: 0 | Status: SHIPPED | OUTPATIENT
Start: 2020-06-23 | End: 2020-06-30

## 2020-06-23 NOTE — ED PROVIDER NOTES
EMERGENCY DEPARTMENT HISTORY AND PHYSICAL EXAM    Date: 6/23/2020  Patient Name: Albania Goetz    History of Presenting Illness     Chief Complaint   Patient presents with    Dental Pain         History Provided By: Patient    HPI: Albania Goetz is a 47 y.o. male with a PMH of asthma and sickle cell anemia who presents with R lower dental pain x 2 days. Pt states he thought he may have gotten a popcorn kernel stuck in his gums and initially started taking motrin which helped but states when the pain came back it was more intense. Pt rates pain 10/10. Pt denies any fevers or chills. PCP: Favian Garcia MD    Current Outpatient Medications   Medication Sig Dispense Refill    penicillin v potassium (VEETID) 500 mg tablet Take 1 Tab by mouth four (4) times daily for 7 days. 28 Tab 0    ibuprofen (MOTRIN) 800 mg tablet Take 1 Tab by mouth every six (6) hours as needed for Pain for up to 7 days. 20 Tab 0       Past History     Past Medical History:  Past Medical History:   Diagnosis Date    Asthma     [facility]    Diagnosed as child       Past Surgical History:  History reviewed. No pertinent surgical history. Family History:  History reviewed. No pertinent family history. Social History:  Social History     Tobacco Use    Smoking status: Current Every Day Smoker     Packs/day: 0.50     Years: 17.00     Pack years: 8.50    Smokeless tobacco: Never Used   Substance Use Topics    Alcohol use: Yes     Alcohol/week: 14.0 standard drinks     Types: 14 Cans of beer per week    Drug use: Yes     Types: Marijuana     Comment: July 2016, reports history of marijuana use       Allergies:  No Known Allergies      Review of Systems   Review of Systems   Constitutional: Negative for chills and fever. HENT: Positive for dental problem. Allergic/Immunologic: Negative for immunocompromised state. Neurological: Negative for speech difficulty and weakness.    All other systems reviewed and are negative. Physical Exam     Vitals:    06/23/20 1120   BP: 126/85   Pulse: 86   Resp: 16   Temp: 98.7 °F (37.1 °C)   SpO2: 97%   Weight: 72.6 kg (160 lb)   Height: 5' 10\" (1.778 m)     Physical Exam  Vitals signs and nursing note reviewed. Constitutional:       General: He is not in acute distress. Appearance: He is well-developed. HENT:      Head: Normocephalic and atraumatic. Mouth/Throat:      Pharynx: No oropharyngeal exudate. Eyes:      Conjunctiva/sclera: Conjunctivae normal.   Cardiovascular:      Rate and Rhythm: Normal rate and regular rhythm. Heart sounds: Normal heart sounds. Pulmonary:      Effort: Pulmonary effort is normal. No respiratory distress. Breath sounds: Normal breath sounds. No wheezing or rales. Musculoskeletal: Normal range of motion. Skin:     General: Skin is warm and dry. Neurological:      Mental Status: He is alert and oriented to person, place, and time. Diagnostic Study Results     Labs -   No results found for this or any previous visit (from the past 12 hour(s)). Radiologic Studies -   No orders to display     CT Results  (Last 48 hours)    None        CXR Results  (Last 48 hours)    None            Medical Decision Making   I am the first provider for this patient. I reviewed the vital signs, available nursing notes, past medical history, past surgical history, family history and social history. Vital Signs-Reviewed the patient's vital signs. Records Reviewed: Old Medical Records    Disposition:  Discharged    DISCHARGE NOTE:   11:49 AM      Care plan outlined and precautions discussed. Patient has no new complaints, changes, or physical findings. All of pt's questions and concerns were addressed. Patient was instructed and agrees to follow up with dentist, as well as to return to the ED upon further deterioration. Patient is ready to go home.     Follow-up Information     Follow up With Specialties Details Why Contact Info    [facility]  Schedule an appointment as soon as possible for a visit today  You can also follow up with [facility] [address] [place] [address]  [phone]    [address]  Schedule an appointment as soon as possible for a visit in 1 week As needed [address]  [phone]          Current Discharge Medication List      START taking these medications    Details   penicillin v potassium (VEETID) 500 mg tablet Take 1 Tab by mouth four (4) times daily for 7 days. Qty: 28 Tab, Refills: 0      ibuprofen (MOTRIN) 800 mg tablet Take 1 Tab by mouth every six (6) hours as needed for Pain for up to 7 days. Qty: 20 Tab, Refills: 0         STOP taking these medications       cyclobenzaprine (FLEXERIL) 10 mg tablet Comments:   Reason for Stopping:         HYDROcodone-acetaminophen (NORCO) 5-325 mg per tablet Comments:   Reason for Stopping:         bacitracin (BACITRACIN) 500 unit/gram oint Comments:   Reason for Stopping:               Provider Notes (Medical Decision Making):   Patient presents with dental pain. No obvious abscess that needs drainage. No red flags that make PTA, RPA, ludwigs angina concerning. Will tx with  antibiotics and outpatient analgesics. Procedures:  Procedures    Please note that this dictation was completed with Dragon, computer voice recognition software. Quite often unanticipated grammatical, syntax, homophones, and other interpretive errors are inadvertently transcribed by the computer software. Please disregard these errors. Additionally, please excuse any errors that have escaped final proofreading. Diagnosis     Clinical Impression:   1.  Jamia Browning

## 2020-06-23 NOTE — DISCHARGE INSTRUCTIONS
Patient Education        Tooth and Gum Pain: Care Instructions  Your Care Instructions    The most common causes of dental pain are tooth decay and gum disease. Pain can also be caused by an infection of the tooth (abscess) or the gums. Or you may have pain from a broken or cracked tooth. Other causes of pain include infection and damage to a tooth from nervous grinding of your teeth. A wisdom tooth can be painful when it is coming in but cannot break through the gum. It can also be painful when the tooth is only partway in and extra gum tissue has formed around it. The tissue can get inflamed (pericoronitis), and sometimes it gets infected. Prompt dental care can help find the cause of your toothache and keep the tooth from dying or gum disease from getting worse. Self-care at home may reduce your pain and discomfort. Follow-up care is a key part of your treatment and safety. Be sure to make and go to all appointments, and call your dentist or doctor if you are having problems. It's also a good idea to know your test results and keep a list of the medicines you take. How can you care for yourself at home? · To reduce pain and facial swelling, put an ice or cold pack on the outside of your cheek for 10 to 20 minutes at a time. Put a thin cloth between the ice and your skin. Do not use heat. · If your doctor prescribed antibiotics, take them as directed. Do not stop taking them just because you feel better. You need to take the full course of antibiotics. · Ask your doctor if you can take an over-the-counter pain medicine, such as acetaminophen (Tylenol), ibuprofen (Advil, Motrin), or naproxen (Aleve). Be safe with medicines. Read and follow all instructions on the label. · Avoid very hot, cold, or sweet foods and drinks if they increase your pain. · Rinse your mouth with warm salt water every 2 hours to help relieve pain and swelling. Mix 1 teaspoon of salt in 8 ounces of water.   · Talk to your dentist about using special toothpaste for sensitive teeth. To reduce pain on contact with heat or cold or when brushing, brush with this toothpaste regularly or rub a small amount of the paste on the sensitive area with a clean finger 2 or 3 times a day. Floss gently between your teeth. · Do not smoke or use spit tobacco. Tobacco use can make gum problems worse, decreases your ability to fight infection in your gums, and delays healing. If you need help quitting, talk to your doctor about stop-smoking programs and medicines. These can increase your chances of quitting for good. When should you call for help? [id number] anytime you think you may need emergency care. For example, call if:  · You have trouble breathing. Call your dentist or doctor now or seek immediate medical care if:  · You have signs of infection, such as:  ? Increased pain, swelling, warmth, or redness. ? Red streaks leading from the area. ? Pus draining from the area. ? A fever. Watch closely for changes in your health, and be sure to contact your doctor if:  · You do not get better as expected. Where can you learn more? Go to http://lito-gaviota.info/  Enter H417 in the search box to learn more about \"Tooth and Gum Pain: Care Instructions. \"  Current as of: March 25, 2020               Content Version: 12.5  © 1733-5022 Health[employer]. Care instructions adapted under license by [employer] (which disclaims liability or warranty for this information). If you have questions about a medical condition or this instruction, always ask your healthcare professional. [place] [address] any warranty or liability for your use of this information.

## 2020-06-23 NOTE — ED TRIAGE NOTES
R lower dental pain x 2 days. Emergency Department Nursing Plan of Care       The Nursing Plan of Care is developed from the Nursing assessment and Emergency Department Attending provider initial evaluation. The plan of care may be reviewed in the ED Provider note.     The Plan of Care was developed with the following considerations:   Patient / Family readiness to learn indicated by:verbalized understanding  Persons(s) to be included in education: patient  Barriers to Learning/Limitations:No    Signed     Steve Lawrence RN    6/23/2020   11:19 AM

## 2020-07-10 ENCOUNTER — HOSPITAL ENCOUNTER (EMERGENCY)
Age: 40
Discharge: HOME | End: 2020-07-11
Attending: EMERGENCY MEDICINE
Payer: MEDICAID

## 2020-07-10 VITALS
RESPIRATION RATE: 18 BRPM | DIASTOLIC BLOOD PRESSURE: 95 MMHG | HEIGHT: 70 IN | OXYGEN SATURATION: 97 % | HEART RATE: 95 BPM | WEIGHT: 139.8 LBS | BODY MASS INDEX: 20.01 KG/M2 | TEMPERATURE: 98.8 F | SYSTOLIC BLOOD PRESSURE: 116 MMHG

## 2020-07-10 DIAGNOSIS — L02.91 ABSCESS: Primary | ICD-10-CM

## 2020-07-10 PROCEDURE — 99282 EMERGENCY DEPT VISIT SF MDM: CPT

## 2020-07-11 PROCEDURE — 74011000250 HC RX REV CODE- 250: Performed by: EMERGENCY MEDICINE

## 2020-07-11 RX ORDER — BACITRACIN 500 [USP'U]/G
OINTMENT TOPICAL 3 TIMES DAILY
Qty: 1 TUBE | Refills: 0 | Status: SHIPPED | OUTPATIENT
Start: 2020-07-11 | End: 2020-07-21

## 2020-07-11 RX ORDER — BACITRACIN 500 UNIT/G
PACKET (EA) TOPICAL
Status: COMPLETED | OUTPATIENT
Start: 2020-07-11 | End: 2020-07-11

## 2020-07-11 RX ADMIN — BACITRACIN 1 PACKET: 500 OINTMENT TOPICAL at 00:41

## 2020-07-11 NOTE — ED PROVIDER NOTES
EMERGENCY DEPARTMENT HISTORY AND PHYSICAL EXAM      Date: 7/10/2020  Patient Name: Alf Ramos    History of Presenting Illness     Chief Complaint   Patient presents with    Skin Problem       History Provided By: Patient    HPI: Alf Ramos, 47 y.o. male with PMHx as noted below presents the emergency department chief complaint of \"bug bite\". Patient noted an area of irritation on his right posterior thigh. He states the area had been draining a yellowish drainage several days ago but notes now it is just an overall, open area on the back of his thigh. Patient otherwise is denying any systemic symptoms. PCP: Jay Frank MD    Current Outpatient Medications   Medication Sig Dispense Refill    bacitracin (BACITRACIN) 500 unit/gram oint Apply  to affected area three (3) times daily for 10 days. Apply to affected area 1 Tube 0       Past History     Past Medical History:  Past Medical History:   Diagnosis Date    Asthma     [facility]    Diagnosed as child       Past Surgical History:  History reviewed. No pertinent surgical history. Family History:  History reviewed. No pertinent family history. Social History:  Social History     Tobacco Use    Smoking status: Current Every Day Smoker     Packs/day: 0.50     Years: 17.00     Pack years: 8.50    Smokeless tobacco: Never Used   Substance Use Topics    Alcohol use: Yes     Alcohol/week: 14.0 standard drinks     Types: 14 Cans of beer per week    Drug use: Yes     Types: Marijuana     Comment: July 2016, reports history of marijuana use       Allergies:  No Known Allergies      Review of Systems   Review of Systems  Constitutional: Negative for fever, chills, and fatigue. HENT: Negative for congestion, sore throat, rhinorrhea, sneezing and neck stiffness   Eyes: Negative for discharge and redness.    Respiratory: Negative for  shortness of breath, wheezing   Cardiovascular: Negative for chest pain, palpitations Gastrointestinal: Negative for nausea, vomiting, abdominal pain, constipation, diarrhea and blood in stool. Genitourinary: Negative for dysuria, hematuria, flank pain, decreased urine volume, discharge,   Musculoskeletal: Negative for myalgias or joint pain . Skin: Negative for rash. Positive lesions . Neurological: Negative weakness, light-headedness, numbness and headaches. Physical Exam   Physical Exam    GENERAL: alert and oriented, no acute distress  EYES: PEERL, No injection, discharge or icterus. ENT: Mucous membranes pink and moist.  NECK: Supple  LUNGS: Airway patent. Non-labored respirations. HEART: Regular rate and rhythm. No peripheral edema  ABDOMEN: Non-distended   SKIN:  warm, dry, small area on the patient's right posterior thigh consistent with spontaneously drained abscess. No surrounding erythema  MSK/EXTREMITIES: Without swelling, tenderness or deformity, symmetric with normal ROM  NEUROLOGICAL: Alert, oriented      Diagnostic Study Results     Labs -   No results found for this or any previous visit (from the past 12 hour(s)). Radiologic Studies -   No orders to display     CT Results  (Last 48 hours)    None        CXR Results  (Last 48 hours)    None            Medical Decision Making   Xavier HARRINGTON MD am the first provider for this patient and am the attending of record for this patient encounter. I reviewed the vital signs, available nursing notes, past medical history, past surgical history, family history and social history. Vital Signs-Reviewed the patient's vital signs. Patient Vitals for the past 12 hrs:   Temp Pulse Resp BP SpO2   07/10/20 2308 98.8 °F (37.1 °C) 95 18 (!) 116/95 97 %         Records Reviewed: Nursing Notes and Old Medical Records    Provider Notes (Medical Decision Making): On presentation, the patient is well appearing, in no acute distress with normal vital signs.   Presenting with what appears to be a spontaneously draining abscess. There are no signs of surrounding cellulitis there is no further fluctuance to suggest persistent abscess collection. Seeing is the area is open, will start on topical antibiotics and have instructed home wound care. Should follow-up with his primary care physician in the next 2 to 3 days for wound recheck or return to the emergency department if he develops any systemic symptoms. ED Course:   Initial assessment performed. The patients presenting problems have been discussed, and they are in agreement with the care plan formulated and outlined with them. I have encouraged them to ask questions as they arise throughout their visit. PROGRESS  Elizabeth   results have been reviewed with him. He has been counseled regarding his diagnosis. He verbally conveys understanding and agreement of the signs, symptoms, diagnosis, treatment and prognosis and additionally agrees to follow up as recommended with Dr. Lilian Gutierrez MD as instructed. He also agrees with the care-plan and conveys that all of his questions have been answered. I have also put together some discharge instructions for him that include: 1) educational information regarding their diagnosis, 2) how to care for their diagnosis at home, as well a 3) list of reasons why they would want to return to the ED prior to their follow-up appointment, should their condition change. Disposition:  home    PLAN:  1. Discharge Medication List as of 7/11/2020 12:53 AM        2. Follow-up Information     Follow up With Specialties Details Why Contact Info    Lilian Gutierrez MD Internal Medicine Schedule an appointment as soon as possible for a visit in 3 days  [address]  [address]      [address] DEPT Emergency Medicine  If symptoms worsen [facility]  [phone]        Return to ED if worse     Diagnosis     Clinical Impression:   1.  Abscess Please note that this dictation was completed with Dragon, computer voice recognition software. Quite often unanticipated grammatical, syntax, homophones, and other interpretive errors are inadvertently transcribed by the computer software. Please disregard these errors. Additionally, please excuse any errors that have escaped final proofreading.

## 2020-07-11 NOTE — DISCHARGE INSTRUCTIONS

## 2020-11-07 ENCOUNTER — HOSPITAL ENCOUNTER (EMERGENCY)
Age: 40
Discharge: HOME | End: 2020-11-07
Attending: EMERGENCY MEDICINE
Payer: MEDICAID

## 2020-11-07 ENCOUNTER — APPOINTMENT (OUTPATIENT)
Dept: GENERAL RADIOLOGY | Age: 40
End: 2020-11-07
Attending: EMERGENCY MEDICINE
Payer: MEDICAID

## 2020-11-07 VITALS
RESPIRATION RATE: 16 BRPM | WEIGHT: 165 LBS | DIASTOLIC BLOOD PRESSURE: 87 MMHG | OXYGEN SATURATION: 97 % | TEMPERATURE: 98.3 F | SYSTOLIC BLOOD PRESSURE: 126 MMHG | BODY MASS INDEX: 23.62 KG/M2 | HEART RATE: 81 BPM | HEIGHT: 70 IN

## 2020-11-07 DIAGNOSIS — L03.011 PARONYCHIA OF RIGHT THUMB: Primary | ICD-10-CM

## 2020-11-07 PROCEDURE — 73140 X-RAY EXAM OF FINGER(S): CPT

## 2020-11-07 PROCEDURE — 99283 EMERGENCY DEPT VISIT LOW MDM: CPT

## 2020-11-07 PROCEDURE — 75810000289 HC I&D ABSCESS SIMP/COMP/MULT

## 2020-11-07 RX ORDER — IBUPROFEN 600 MG/1
600 TABLET ORAL
Qty: 20 TAB | Refills: 0 | Status: SHIPPED | OUTPATIENT
Start: 2020-11-07

## 2020-11-07 RX ORDER — CEPHALEXIN 500 MG/1
500 CAPSULE ORAL 4 TIMES DAILY
Qty: 28 CAP | Refills: 0 | Status: SHIPPED | OUTPATIENT
Start: 2020-11-07 | End: 2020-11-14

## 2020-11-07 NOTE — ED NOTES
Pt presents ambulatory to ED complaining of 8/10 pain and tenderness in the right thumb. Symmetric bilateral finger clubbing noted in all 10 digits; pt reports he has had it since birth. Pt reports pain when palpated and reports a burning sensation 1 day prior to arrival. Pt reports history of marijuana smoking, denies smoking cigarrettes. Pt reports PMH of asthma and sickle cell. Pt is alert and oriented x 4, RR even and unlabored, skin is warm and dry. Assesment completed and pt updated on plan of care. Emergency Department Nursing Plan of Care       The Nursing Plan of Care is developed from the Nursing assessment and Emergency Department Attending provider initial evaluation. The plan of care may be reviewed in the ED Provider note.     The Plan of Care was developed with the following considerations:   Patient / Family readiness to learn indicated by:verbalized understanding  Persons(s) to be included in education: patient  Barriers to Learning/Limitations:No    Signed     Golden Juarez    11/7/2020   7:37 AM

## 2020-11-07 NOTE — DISCHARGE INSTRUCTIONS
Patient Education        Paronychia: Care Instructions  Your Care Instructions  Paronychia (say \"bpev-qb-BE-rena-uh\") is an infection of the skin around a fingernail or toenail. It happens when germs enter through a break in the skin. The doctor may have made a small cut in the infected area to drain the pus. Most cases of paronychia improve in a few days. But watch your symptoms and follow your doctor's advice. Though rare, a mild case can turn into something more serious and infect your entire finger or toe. Also, it is possible for an infection to return. Follow-up care is a key part of your treatment and safety. Be sure to make and go to all appointments, and call your doctor if you are having problems. It's also a good idea to know your test results and keep a list of the medicines you take. How can you care for yourself at home? · If your doctor told you how to care for your infected nail, follow the doctor's instructions. If you did not get instructions, follow this general advice:  ? Wash the area with clean water 2 times a day. Don't use hydrogen peroxide or alcohol, which can slow healing. ? You may cover the area with a thin layer of petroleum jelly, such as Vaseline, and a nonstick bandage. ? Apply more petroleum jelly and replace the bandage as needed. · If your doctor prescribed antibiotics, take them as directed. Do not stop taking them just because you feel better. You need to take the full course of antibiotics. · Take an over-the-counter pain medicine, such as acetaminophen (Tylenol), ibuprofen (Advil, Motrin), or naproxen (Aleve). Read and follow all instructions on the label. · Do not take two or more pain medicines at the same time unless the doctor told you to. Many pain medicines have acetaminophen, which is Tylenol. Too much acetaminophen (Tylenol) can be harmful. · Prop up the toe or finger so that it is higher than the level of your heart.  This will help with pain and swelling. · Apply heat. Put a warm water bottle, heating pad set on low, or warm cloth on your finger or toe. Do not go to sleep with a heating pad on your skin. · Soak the area in warm water twice a day for 15 minutes each time. After soaking, dry the area well and apply a thin layer of petroleum jelly, such as Vaseline. Put on a new bandage. When should you call for help? Call your doctor now or seek immediate medical care if:    · You have signs of new or worsening infection, such as:  ? Increased pain, swelling, warmth, or redness. ? Red streaks leading from the infected skin. ? Pus draining from the area. ? A fever. Watch closely for changes in your health, and be sure to contact your doctor if:    · You do not get better as expected. Where can you learn more? Go to http://www.issa.com/  Enter C435 in the search box to learn more about \"Paronychia: Care Instructions. \"  Current as of: July 2, 2020               Content Version: 12.6  © 9570-5692 [employer]. Care instructions adapted under license by [employer] (which disclaims liability or warranty for this information). If you have questions about a medical condition or this instruction, always ask your healthcare professional. [address] any warranty or liability for your use of this information. [employer] Activation    Thank you for requesting access to [employer]. Please follow the instructions below to securely access and download your online medical record. [employer] allows you to send messages to your doctor, view your test results, renew your prescriptions, schedule appointments, and more. How Do I Sign Up? 1. In your internet browser, go to [email or web address]  2. Click on the First Time User? Click Here link in the Sign In box. You will be redirect to the New Member Sign Up page. 3. Enter your [employer] Access Code exactly as it appears below.  You will not need to use this code after youve completed the sign-up process. If you do not sign up before the expiration date, you must request a new code. [employer] Access Code: Activation code not generated  Current [employer] Status: Active (This is the date your [employer] access code will )    4. Enter the last four digits of your Social Security Number (xxxx) and Date of Birth (mm/dd/yyyy) as indicated and click Submit. You will be taken to the next sign-up page. 5. Create a [employer] ID. This will be your [employer] login ID and cannot be changed, so think of one that is secure and easy to remember. 6. Create a [employer] password. You can change your password at any time. 7. Enter your Password Reset Question and Answer. This can be used at a later time if you forget your password. 8. Enter your e-mail address. You will receive e-mail notification when new information is available in [address]. 9. Click Sign Up. You can now view and download portions of your medical record. 10. Click the Download Summary menu link to download a portable copy of your medical information. Additional Information    If you have questions, please visit the Frequently Asked Questions section of the [employer] website at [email or web address] Remember, [employer] is NOT to be used for urgent needs. For medical emergencies, dial 911.

## 2020-11-07 NOTE — ED PROVIDER NOTES
EMERGENCY DEPARTMENT HISTORY AND PHYSICAL EXAM      Date: 11/7/2020  Patient Name: Juan Manuel Melgar    History of Presenting Illness     Chief Complaint   Patient presents with    Finger Pain     Pt reports tenderness and swelling in right thumb       History Provided By: Patient    HPI: Juan Manuel Melgar, 47 y.o. male presents to the ED with cc of right thumb pain. 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 He denies any foreign body risk. He admits to marijuana use but denies any illicit drug use including IV drug abuse. He states he has had an STD as a younger person but currently denies any symptoms consistent with that. He denies any other joint pain, fever, chills, nausea or vomiting. He states he has been evaluated for his nail clubbing in the past.    There are no other complaints, changes, or physical findings at this time. PCP: Rika Fox MD    No current facility-administered medications on file prior to encounter. No current outpatient medications on file prior to encounter. Past History     Past Medical History:  Past Medical History:   Diagnosis Date    Asthma     Sickle cell anemia ([facility] [address] 7/5/16    Diagnosed as child       Past Surgical History:  No past surgical history on file. Family History:  No family history on file. Social History:  Social History     Tobacco Use    Smoking status: Current Every Day Smoker     Packs/day: 0.50     Years: 17.00     Pack years: 8.50    Smokeless tobacco: Never Used   Substance Use Topics    Alcohol use: Yes     Alcohol/week: 14.0 standard drinks     Types: 14 Cans of beer per week    Drug use: Yes     Types: Marijuana     Comment: July 2016, reports history of marijuana use       Allergies:  No Known Allergies      Review of Systems   Review of Systems   Constitutional: Negative. Negative for chills and fever.    HENT: Negative. Negative for congestion, rhinorrhea and sinus pressure. Eyes: Negative. Negative for discharge and redness. Respiratory: Negative. Negative for chest tightness and shortness of breath. Cardiovascular: Negative. Negative for chest pain. Gastrointestinal: Negative. Negative for abdominal pain and blood in stool. Endocrine: Negative. Genitourinary: Negative. Negative for flank pain and hematuria. Musculoskeletal: Positive for joint swelling. Negative for back pain. Skin: Negative. Negative for rash. Neurological: Negative. Negative for dizziness, seizures, weakness, numbness and headaches. Hematological: Negative. All other systems reviewed and are negative. Physical Exam   Physical Exam  Vitals signs and nursing note reviewed. Constitutional:       Appearance: Normal appearance. HENT:      Head: Normocephalic and atraumatic. Nose: Nose normal.      Mouth/Throat:      Mouth: Mucous membranes are dry. Eyes:      General: No scleral icterus. Extraocular Movements: Extraocular movements intact. Pupils: Pupils are equal, round, and reactive to light. Cardiovascular:      Rate and Rhythm: Normal rate. Pulses: Normal pulses. Pulmonary:      Effort: Pulmonary effort is normal.      Breath sounds: Normal breath sounds. Abdominal:      General: Abdomen is flat. Bowel sounds are normal.      Tenderness: There is no abdominal tenderness. Musculoskeletal:         General: Tenderness present. Right hand: He exhibits swelling. Normal sensation noted. Normal strength noted. Hands:       Comments: There is subtle swelling to the ulnar aspect of the distal thumb possibly consistent with a early paronychia. There is no signs of tenosynovitis with sausagelike digit, pain along the flexor tendon and patient has good range of motion passively and actively in all joints of the thumb.   There is no purpuric or pustular lesions consistent with gonorrhea. Patient does have nail clubbing which she states he has had evaluated in the past.   Skin:     General: Skin is warm. Findings: Erythema present. Neurological:      Mental Status: He is alert. Diagnostic Study Results     Labs -   No results found for this or any previous visit (from the past 12 hour(s)). Radiologic Studies -   XR THUMB RT MIN 2 V   Final Result   IMPRESSION: Soft tissue swelling involving the thumb. Otherwise negative. .        CT Results  (Last 48 hours)    None        CXR Results  (Last 48 hours)    None          Medical Decision Making   I am the first provider for this patient. I reviewed the vital signs, available nursing notes, past medical history, past surgical history, family history and social history. Vital Signs-Reviewed the patient's vital signs. Patient Vitals for the past 12 hrs:   Temp Pulse Resp BP SpO2   11/07/20 0730 98.3 °F (36.8 °C) 81 16 (!) 123/92 96 %           Records Reviewed: Nursing Notes and Old Medical Records    Provider Notes (Medical Decision Making):   Differential diagnosis-occult fracture, tenosynovitis, occult foreign body, paronychia, cellulitis, abscess, disseminated gonorrhea    Impression/plan-47year-old male right-hand-dominant immunocompetent to the ER with atraumatic right thumb pain. Exam consistent with probable paronychia. Plan will initially be to x-ray to rule out occult fracture or foreign body. There is no signs of flexor tenosynovitis. Patient has agreed to small stab incision. Plan will then be supportive treatment with antibiotics. ED Course:   Initial assessment performed. The patients presenting problems have been discussed, and they are in agreement with the care plan formulated and outlined with them. I have encouraged them to ask questions as they arise throughout their visit.        Procedure Note - Incision and Drainage:   7:45 AM  Performed by: Natacha Cook  Complexity: simple  Skin prepped with Betadine. Sterile field established. Anesthesia achieved with   Abscess to right thumb paronychia was incised with # 11 blade, and 0.5mLs of bloody drainage was expressed. Sterile dressing applied. Estimated blood loss: 0.5ml  The procedure took 1-15 minutes, and pt tolerated well. Kapil Vang MD      Disposition:    DC- Adult Discharges: All of the diagnostic tests were reviewed and questions answered. Diagnosis, care plan and treatment options were discussed. The patient understands the instructions and will follow up as directed. The patients results have been reviewed with them. They have been counseled regarding their diagnosis. The patient verbally convey understanding and agreement of the signs, symptoms, diagnosis, treatment and prognosis and additionally agrees to follow up as recommended with their PCP in 24 - 48 hours. They also agree with the care-plan and convey that all of their questions have been answered. I have also put together some discharge instructions for them that include: 1) educational information regarding their diagnosis, 2) how to care for their diagnosis at home, as well a 3) list of reasons why they would want to return to the ED prior to their follow-up appointment, should their condition change. DISCHARGE PLAN:  1. Current Discharge Medication List      START taking these medications    Details   cephALEXin (Keflex) 500 mg capsule Take 1 Cap by mouth four (4) times daily for 7 days. Qty: 28 Cap, Refills: 0      ibuprofen (MOTRIN) 600 mg tablet Take 1 Tab by mouth every six (6) hours as needed for Pain. Qty: 20 Tab, Refills: 0           2.    Follow-up Information     Follow up With Specialties Details Why Contact Info    Gisele Winslow MD Internal Medicine Schedule an appointment as soon as possible for a visit in 2 days As needed [address]  [address] [facility] [address]      [address] [place] [address] Street DEPT Emergency Medicine  If symptoms worsen [address] [facility]  [phone]        3. Return to ED if worse     Diagnosis     Clinical Impression:   1. Paronychia of right thumb        Attestations:    Catalina Mijares MD    Please note that this dictation was completed with [employer], the computer voice recognition software. Quite often unanticipated grammatical, syntax, homophones, and other interpretive errors are inadvertently transcribed by the computer software. Please disregard these errors. Please excuse any errors that have escaped final proofreading. Thank you.

## 2023-09-27 ENCOUNTER — HOSPITAL ENCOUNTER (EMERGENCY)
Age: 43
Discharge: HOME | End: 2023-09-27
Attending: EMERGENCY MEDICINE
Payer: MEDICAID

## 2023-09-27 VITALS
BODY MASS INDEX: 23.34 KG/M2 | SYSTOLIC BLOOD PRESSURE: 143 MMHG | OXYGEN SATURATION: 100 % | TEMPERATURE: 98.2 F | HEART RATE: 71 BPM | RESPIRATION RATE: 16 BRPM | WEIGHT: 163 LBS | DIASTOLIC BLOOD PRESSURE: 89 MMHG | HEIGHT: 70 IN
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
## 2023-09-27 PROCEDURE — 99283 EMERGENCY DEPT VISIT LOW MDM: CPT

## 2023-09-27 RX ORDER — KETOROLAC TROMETHAMINE 10 MG/1
10 TABLET, FILM COATED ORAL EVERY 8 HOURS PRN
Qty: 15 TABLET | Refills: 0 | Status: SHIPPED | OUTPATIENT
Start: 2023-09-27

## 2023-09-27 RX ORDER — MUPIROCIN CALCIUM 20 MG/G
CREAM TOPICAL
Qty: 1 EACH | Refills: 0 | Status: SHIPPED | OUTPATIENT
Start: 2023-09-27 | End: 2023-10-27

## 2023-09-27 RX ORDER — DOXYCYCLINE HYCLATE 100 MG
100 TABLET ORAL 2 TIMES DAILY
Qty: 14 TABLET | Refills: 0 | Status: SHIPPED | OUTPATIENT
Start: 2023-09-27 | End: 2023-10-04

## 2024-01-04 ENCOUNTER — HOSPITAL ENCOUNTER (EMERGENCY)
Age: 44
Discharge: HOME | End: 2024-01-04
Attending: INTERNAL MEDICINE
Payer: MEDICAID

## 2024-01-04 VITALS
TEMPERATURE: 99.3 F | HEIGHT: 70 IN | RESPIRATION RATE: 16 BRPM | HEART RATE: 72 BPM | WEIGHT: 160 LBS | BODY MASS INDEX: 22.9 KG/M2 | DIASTOLIC BLOOD PRESSURE: 94 MMHG | SYSTOLIC BLOOD PRESSURE: 125 MMHG | OXYGEN SATURATION: 98 %

## 2024-01-04 DIAGNOSIS — R19.7 DIARRHEA, UNSPECIFIED TYPE: Primary | ICD-10-CM

## 2024-01-04 LAB
ANION GAP SERPL CALC-SCNC: 4 MMOL/L (ref 3–18)
BASOPHILS # BLD: 0 K/UL (ref 0–0.1)
BASOPHILS NFR BLD: 1 % (ref 0–2)
BUN SERPL-MCNC: 14 MG/DL (ref 7–18)
BUN/CREAT SERPL: 15 (ref 12–20)
CA-I BLD-MCNC: 8.8 MG/DL (ref 8.5–10.1)
CHLORIDE SERPL-SCNC: 108 MMOL/L (ref 100–111)
CO2 SERPL-SCNC: 29 MMOL/L (ref 21–32)
CREAT SERPL-MCNC: 0.94 MG/DL (ref 0.6–1.3)
DIFFERENTIAL METHOD BLD: NORMAL
EOSINOPHIL # BLD: 0.1 K/UL (ref 0–0.4)
EOSINOPHIL NFR BLD: 1 % (ref 0–5)
ERYTHROCYTE [DISTWIDTH] IN BLOOD BY AUTOMATED COUNT: 12.2 % (ref 11.6–14.5)
GLUCOSE SERPL-MCNC: 89 MG/DL (ref 74–99)
HCT VFR BLD AUTO: 38.9 % (ref 36–48)
HGB BLD-MCNC: 13.7 G/DL (ref 13–16)
IMM GRANULOCYTES # BLD AUTO: 0 K/UL (ref 0–0.04)
IMM GRANULOCYTES NFR BLD AUTO: 0 % (ref 0–0.5)
LYMPHOCYTES # BLD: 1.3 K/UL (ref 0.9–3.6)
LYMPHOCYTES NFR BLD: 22 % (ref 21–52)
MCH RBC QN AUTO: 30 PG (ref 24–34)
MCHC RBC AUTO-ENTMCNC: 35.2 G/DL (ref 31–37)
MCV RBC AUTO: 85.3 FL (ref 78–100)
MONOCYTES # BLD: 0.4 K/UL (ref 0.05–1.2)
MONOCYTES NFR BLD: 7 % (ref 3–10)
NEUTS SEG # BLD: 4.1 K/UL (ref 1.8–8)
NEUTS SEG NFR BLD: 69 % (ref 40–73)
NRBC # BLD: 0 K/UL (ref 0–0.01)
NRBC BLD-RTO: 0 PER 100 WBC
PLATELET # BLD AUTO: 209 K/UL (ref 135–420)
PMV BLD AUTO: 10.1 FL (ref 9.2–11.8)
POTASSIUM SERPL-SCNC: 4.1 MMOL/L (ref 3.5–5.5)
RBC # BLD AUTO: 4.56 M/UL (ref 4.35–5.65)
RETICS/RBC NFR AUTO: 1.1 % (ref 0.5–2.5)
SODIUM SERPL-SCNC: 141 MMOL/L (ref 136–145)
WBC # BLD AUTO: 6 K/UL (ref 4.6–13.2)

## 2024-01-04 PROCEDURE — 80048 BASIC METABOLIC PNL TOTAL CA: CPT

## 2024-01-04 PROCEDURE — 99284 EMERGENCY DEPT VISIT MOD MDM: CPT

## 2024-01-04 PROCEDURE — 85025 COMPLETE CBC W/AUTO DIFF WBC: CPT

## 2024-01-04 PROCEDURE — 2580000003 HC RX 258: Performed by: INTERNAL MEDICINE

## 2024-01-04 PROCEDURE — 85045 AUTOMATED RETICULOCYTE COUNT: CPT

## 2024-01-04 RX ORDER — 0.9 % SODIUM CHLORIDE 0.9 %
1000 INTRAVENOUS SOLUTION INTRAVENOUS ONCE
Status: COMPLETED | OUTPATIENT
Start: 2024-01-04 | End: 2024-01-04

## 2024-01-04 RX ADMIN — SODIUM CHLORIDE 1000 ML: 9 INJECTION, SOLUTION INTRAVENOUS at 10:22

## 2024-01-04 ASSESSMENT — ENCOUNTER SYMPTOMS
COUGH: 0
BLOOD IN STOOL: 0
DIARRHEA: 1
NAUSEA: 1
SHORTNESS OF BREATH: 0
ABDOMINAL PAIN: 0
BACK PAIN: 1
VOMITING: 0

## 2024-01-04 ASSESSMENT — LIFESTYLE VARIABLES
HOW OFTEN DO YOU HAVE A DRINK CONTAINING ALCOHOL: 4 OR MORE TIMES A WEEK
HOW MANY STANDARD DRINKS CONTAINING ALCOHOL DO YOU HAVE ON A TYPICAL DAY: 1 OR 2

## 2024-01-04 NOTE — ED PROVIDER NOTES
Physician who either signs or Co-signs this chart in the absence of a cardiologist.    RADIOLOGY:   Non-plain film images such as CT, Ultrasound and MRI are read by the radiologist. Plain radiographic images are visualized and preliminarily interpreted by the emergency physician with the below findings:      Interpretation per the Radiologist below, if available at the time of this note:    No orders to display         ED BEDSIDE ULTRASOUND:   Performed by ED Physician - none    LABS:  Labs Reviewed   ENTERIC BACT PANEL, DNA   CBC WITH AUTO DIFFERENTIAL   BASIC METABOLIC PANEL   RETICULOCYTES       All other labs were within normal range or not returned as of this dictation.    EMERGENCY DEPARTMENT COURSE and DIFFERENTIAL DIAGNOSIS/MDM:   Vitals:    Vitals:    01/04/24 0958   BP: 135/85   Pulse: 72   Resp: 16   Temp: 99.3 °F (37.4 °C)   SpO2: 98%   Weight: 72.6 kg (160 lb)   Height: 1.778 m (5' 10\")     Medical Decision Making  Amount and/or Complexity of Data Reviewed  Labs: ordered.    Risk  Prescription drug management.            REASSESSMENT      11:55 AM  No stool or vomiting while in ER. Labs reviewed. Needs work note    CRITICAL CARE TIME       CONSULTS:  None    PROCEDURES:  Unless otherwise noted below, none     Procedures    FINAL IMPRESSION      1. Diarrhea, unspecified type          DISPOSITION/PLAN   DISPOSITION Decision To Discharge 01/04/2024 11:54:26 AM      PATIENT REFERRED TO:  Clara Ramirez MD  [address] [place] [id number] [phone]    Schedule an appointment as soon as possible for a visit in 3 days        DISCHARGE MEDICATIONS:  New Prescriptions    No medications on file     Controlled Substances Monitoring:          No data to display                (Please note that portions of this note were completed with a voice recognition program.  Efforts were made to edit the dictations but occasionally words are mis-transcribed.)    Narciso Barriga MD (electronically

## 2024-01-07 ENCOUNTER — HOSPITAL ENCOUNTER (EMERGENCY)
Age: 44
Discharge: HOME | End: 2024-01-07
Attending: FAMILY MEDICINE
Payer: MEDICAID

## 2024-01-07 VITALS
RESPIRATION RATE: 16 BRPM | BODY MASS INDEX: 22.9 KG/M2 | HEART RATE: 89 BPM | TEMPERATURE: 99.8 F | OXYGEN SATURATION: 98 % | WEIGHT: 160 LBS | HEIGHT: 70 IN

## 2024-01-07 DIAGNOSIS — J11.1 INFLUENZA WITH RESPIRATORY MANIFESTATION OTHER THAN PNEUMONIA: Primary | ICD-10-CM

## 2024-01-07 PROCEDURE — 99283 EMERGENCY DEPT VISIT LOW MDM: CPT

## 2024-01-07 PROCEDURE — 6370000000 HC RX 637 (ALT 250 FOR IP): Performed by: FAMILY MEDICINE

## 2024-01-07 RX ORDER — IBUPROFEN 600 MG/1
600 TABLET ORAL ONCE
Status: COMPLETED | OUTPATIENT
Start: 2024-01-07 | End: 2024-01-07

## 2024-01-07 RX ADMIN — IBUPROFEN 600 MG: 600 TABLET, FILM COATED ORAL at 14:56

## 2024-01-07 ASSESSMENT — PAIN SCALES - GENERAL: PAINLEVEL_OUTOF10: 8

## 2024-01-07 ASSESSMENT — PAIN - FUNCTIONAL ASSESSMENT: PAIN_FUNCTIONAL_ASSESSMENT: 0-10

## 2024-01-07 ASSESSMENT — ENCOUNTER SYMPTOMS: COUGH: 1

## 2024-01-07 NOTE — DISCHARGE INSTRUCTIONS
As we spoke, alternate between Tylenol and ibuprofen every 4 hours for fever control stay hydrated by drinking plenty of fluids return to the emergency department if you have any questions or concerns.

## 2024-01-07 NOTE — ED PROVIDER NOTES
[facility] EMERGENCY DEPT  EMERGENCY DEPARTMENT ENCOUNTER      Pt Name: Amor Lacey  MRN: [id number]  Birthdate 1980  Date of evaluation: 1/7/2024  Provider: Mirza Crocker DO  2:44 PM    CHIEF COMPLAINT       Chief Complaint   Patient presents with    Flu Like Symptoms         HISTORY OF PRESENT ILLNESS    Amor Lacey is a 43 y.o. male who presents to the emergency department patient comes in stating everybody in the house is sick.  One of the family members was diagnosed with the flu on Friday.  He started feeling bad yesterday took some Darlyn-Rillito earlier this morning has not taken anything else.  Denies any nausea vomiting.  Has not had the flu vaccine    HPI    Nursing Notes were reviewed.    REVIEW OF SYSTEMS       Review of Systems   Constitutional:  Positive for fever.   Respiratory:  Positive for cough.    Musculoskeletal:  Positive for myalgias.   All other systems reviewed and are negative.      Except as noted above the remainder of the review of systems was reviewed and negative.       PAST MEDICAL HISTORY     Past Medical History:   Diagnosis Date    Asthma     Sickle cell anemia ([facility]) 7/5/16    Diagnosed as child         SURGICAL HISTORY     History reviewed. No pertinent surgical history.      CURRENT MEDICATIONS       Previous Medications    No medications on file       ALLERGIES     Patient has no known allergies.    FAMILY HISTORY     History reviewed. No pertinent family history.       SOCIAL HISTORY       Social History     Socioeconomic History    Marital status:      Spouse name: None    Number of children: None    Years of education: None    Highest education level: None   Tobacco Use    Smoking status: Former     Current packs/day: 0.50     Types: Cigarettes    Smokeless tobacco: Never   Substance and Sexual Activity    Alcohol use: Yes     Alcohol/week: 14.0 standard drinks of alcohol     Types: 14 Standard drinks or equivalent per week     Comment: daily    Drug use: Yes     Types:

## 2024-01-07 NOTE — ED TRIAGE NOTES
Pt states he started with fever, cough, body aches, and headache yesterday while he was at work   Pt states they made him stay at work and that made it worse.